# Patient Record
Sex: MALE | Race: WHITE | NOT HISPANIC OR LATINO | URBAN - METROPOLITAN AREA
[De-identification: names, ages, dates, MRNs, and addresses within clinical notes are randomized per-mention and may not be internally consistent; named-entity substitution may affect disease eponyms.]

---

## 2021-03-15 ENCOUNTER — TRANSFERRED RECORDS (OUTPATIENT)
Dept: HEALTH INFORMATION MANAGEMENT | Facility: CLINIC | Age: 61
End: 2021-03-15

## 2021-03-17 ENCOUNTER — MEDICAL CORRESPONDENCE (OUTPATIENT)
Dept: HEALTH INFORMATION MANAGEMENT | Facility: CLINIC | Age: 61
End: 2021-03-17

## 2021-03-23 ENCOUNTER — TRANSCRIBE ORDERS (OUTPATIENT)
Dept: OTHER | Age: 61
End: 2021-03-23

## 2021-03-23 DIAGNOSIS — H02.403 UNSPECIFIED PTOSIS OF BILATERAL EYELIDS: Primary | ICD-10-CM

## 2021-03-25 ENCOUNTER — TELEPHONE (OUTPATIENT)
Dept: OPHTHALMOLOGY | Facility: CLINIC | Age: 61
End: 2021-03-25

## 2021-03-25 NOTE — TELEPHONE ENCOUNTER
[facility] Call Center    Phone Message    May a detailed message be left on voicemail: no     Reason for Call: Other: Appointment questions    Very friendly patient. He is scheduled for Ptosis of bilateral eyelids, ref by Yoselin Loredo at [facility] on 3/29/2021 at 9:30 AM with Dr. Porter. Informed this would be for a consultation and you would set up a procedure from there and I am unsure if anything is ever done the same day as the consultation. He does still have questions he would like answered prior to this appointment. Routing to clinic to explain appointment.  Will he need a ?  How long is this appointment?  Is procedure same day?  Recovery time of procedure?  Will his eyes be covered after with gauze?     Action Taken: Message routed to:  [facility] ([facility]): [facility] OPHTHALMOLOGY [facility]ULT [facility] [[id number]]    Travel Screening: Negative

## 2021-03-26 ENCOUNTER — TELEPHONE (OUTPATIENT)
Dept: OPHTHALMOLOGY | Facility: CLINIC | Age: 61
End: 2021-03-26

## 2021-03-26 NOTE — TELEPHONE ENCOUNTER
FUTURE VISIT INFORMATION      FUTURE VISIT INFORMATION:    Date: 3.29.21    Time: 9:30    [place] [facility] [place]  REFERRAL INFORMATION:    Referring provider:  Yoselin Loredo    Referring providers clinic:  [facility]    Reason for visit/diagnosis: Ptosis of bilateral eyelids    RECORDS REQUESTED FROM:       Clinic name Comments Records Status Imaging Status   [facility] 3.15.21 Asif Armas Scanned                                    Action 3.26.21 7:11 AM BRANDEE   Action Taken Requested records [phone]

## 2021-03-29 ENCOUNTER — OFFICE VISIT (OUTPATIENT)
Dept: OPHTHALMOLOGY | Facility: CLINIC | Age: 61
End: 2021-03-29
Attending: NURSE PRACTITIONER
Payer: COMMERCIAL

## 2021-03-29 ENCOUNTER — PRE VISIT (OUTPATIENT)
Dept: OPHTHALMOLOGY | Facility: CLINIC | Age: 61
End: 2021-03-29

## 2021-03-29 ENCOUNTER — TELEPHONE (OUTPATIENT)
Dept: OPHTHALMOLOGY | Facility: CLINIC | Age: 61
End: 2021-03-29

## 2021-03-29 DIAGNOSIS — H02.403 UNSPECIFIED PTOSIS OF BILATERAL EYELIDS: ICD-10-CM

## 2021-03-29 DIAGNOSIS — H57.813 BROW PTOSIS, BILATERAL: Primary | ICD-10-CM

## 2021-03-29 PROBLEM — K92.1 HEMATOCHEZIA: Status: ACTIVE | Noted: 2021-03-08

## 2021-03-29 PROBLEM — K21.9 GASTROESOPHAGEAL REFLUX DISEASE: Status: ACTIVE | Noted: 2021-03-29

## 2021-03-29 PROBLEM — G89.29 CHRONIC LOW BACK PAIN: Status: ACTIVE | Noted: 2021-03-29

## 2021-03-29 PROBLEM — M19.90 OSTEOARTHRITIS: Status: ACTIVE | Noted: 2021-03-29

## 2021-03-29 PROBLEM — S74.00XA INJURY OF SCIATIC NERVE: Status: ACTIVE | Noted: 2021-03-29

## 2021-03-29 PROBLEM — G25.0 ESSENTIAL TREMOR: Status: ACTIVE | Noted: 2021-03-08

## 2021-03-29 PROBLEM — R73.03 PREDIABETES: Status: ACTIVE | Noted: 2021-03-29

## 2021-03-29 PROBLEM — E03.9 HYPOTHYROIDISM: Status: ACTIVE | Noted: 2021-03-08

## 2021-03-29 PROBLEM — F41.1 GENERALIZED ANXIETY DISORDER: Status: ACTIVE | Noted: 2021-03-29

## 2021-03-29 PROBLEM — M54.50 CHRONIC LOW BACK PAIN: Status: ACTIVE | Noted: 2021-03-29

## 2021-03-29 PROBLEM — I10 ESSENTIAL HYPERTENSION: Status: ACTIVE | Noted: 2021-03-08

## 2021-03-29 PROBLEM — E78.5 HYPERLIPIDEMIA: Status: ACTIVE | Noted: 2021-03-08

## 2021-03-29 PROBLEM — M25.519 SHOULDER PAIN: Status: ACTIVE | Noted: 2021-03-29

## 2021-03-29 PROBLEM — R41.3 POOR SHORT-TERM MEMORY: Status: ACTIVE | Noted: 2021-03-29

## 2021-03-29 PROBLEM — S06.9XAA TRAUMATIC BRAIN INJURY (H): Status: ACTIVE | Noted: 2021-03-29

## 2021-03-29 PROBLEM — K57.92 DIVERTICULITIS: Status: ACTIVE | Noted: 2021-03-08

## 2021-03-29 PROBLEM — F41.9 ANXIETY: Status: ACTIVE | Noted: 2021-03-29

## 2021-03-29 PROCEDURE — 92082 INTERMEDIATE VISUAL FIELD XM: CPT | Mod: GC | Performed by: OPHTHALMOLOGY

## 2021-03-29 PROCEDURE — 99204 OFFICE O/P NEW MOD 45 MIN: CPT | Mod: GC | Performed by: OPHTHALMOLOGY

## 2021-03-29 PROCEDURE — 92285 EXTERNAL OCULAR PHOTOGRAPHY: CPT | Mod: GC | Performed by: OPHTHALMOLOGY

## 2021-03-29 RX ORDER — PROPRANOLOL HYDROCHLORIDE 20 MG/1
TABLET ORAL
COMMUNITY
Start: 2019-12-19

## 2021-03-29 RX ORDER — ATORVASTATIN CALCIUM 40 MG/1
40 TABLET, FILM COATED ORAL
COMMUNITY
Start: 2021-03-10

## 2021-03-29 RX ORDER — LISINOPRIL 40 MG/1
TABLET ORAL
COMMUNITY
Start: 2021-01-26

## 2021-03-29 RX ORDER — DONEPEZIL HYDROCHLORIDE 10 MG/1
TABLET, FILM COATED ORAL
COMMUNITY
Start: 2019-12-19

## 2021-03-29 RX ORDER — LEVOTHYROXINE SODIUM 125 UG/1
TABLET ORAL
COMMUNITY
Start: 2021-01-26

## 2021-03-29 RX ORDER — CALCIUM POLYCARBOPHIL 625 MG
625 TABLET ORAL
COMMUNITY

## 2021-03-29 RX ORDER — CARBAMAZEPINE 200 MG/1
TABLET ORAL
COMMUNITY
Start: 2021-01-26

## 2021-03-29 RX ORDER — CYCLOBENZAPRINE HCL 10 MG
TABLET ORAL
COMMUNITY
Start: 2019-09-13

## 2021-03-29 ASSESSMENT — VISUAL ACUITY
OD_CC: 20/20
CORRECTION_TYPE: GLASSES
METHOD: SNELLEN - LINEAR
OS_CC: 20/20

## 2021-03-29 ASSESSMENT — CONF VISUAL FIELD
METHOD: COUNTING FINGERS
OS_NORMAL: 1
OD_NORMAL: 1

## 2021-03-29 ASSESSMENT — EXTERNAL EXAM - RIGHT EYE: OD_EXAM: BROW PTOSIS

## 2021-03-29 ASSESSMENT — MARGIN REFLEX DISTANCE
OS_MRD1: 1
OD_MRD1: 1

## 2021-03-29 ASSESSMENT — TONOMETRY
OS_IOP_MMHG: 14
OD_IOP_MMHG: 18
IOP_METHOD: ICARE

## 2021-03-29 ASSESSMENT — EXTERNAL EXAM - LEFT EYE: OS_EXAM: BROW PTOSIS

## 2021-03-29 NOTE — PATIENT INSTRUCTIONS
"Ptosis (Drooping Eyelids)    Eyelid ptosis (pronounced \"asa-sis\") is a condition in which the upper eyelid droops or sags. It can affect one or both eyes. Sometimes the eyelid droops enough to obstruct the upper field of vision and/or side vision, requiring correction.??Ptosis Repair is a surgical procedure that can correct drooping eyelid(s). Depending upon the degree and cause, repair involves either resection (shortening) of a muscle in the eyelid or suspension with a muscle of the brow. Typically, the levator muscle (the major muscle responsible for elevating the upper eyelid) is shortened though an incision made along the natural crease of the lid. Excess skin weighing down the eyelid may also be removed.     Congenital Ptosis  Present from birth, the most common cause of congenital ptosis is the improper development of the levator muscle. Children may need tilt their head back or lift their eyelid with a finger to see. They may also develop amblyopia (\"lazy eye\"), strabismus (eyes that are not properly aligned), astigmatism, or blurred vision. Repair for mild to moderate congenital ptosis is generally performed between ages 3 and 5. Severe visual obstruction may require earlier treatment. ??Repair is usually performed in an outpatient surgical facility under general anesthesia so the child will not become anxious or restless during the procedure.     Acquired Ptosis  Most commonly due to age-related weakening of the levator muscle, acquired ptosis may also be caused by injury, trauma, or procedures, such as cataract surgery, which can cause weak tendons to stretch. Acquired ptosis may also be the first sign of some diseases, such as myasthenia gravis (a disorder in which the muscles become weak), or Marly's syndrome (a neurological condition that indicates injury to part of the sympathetic nervous system).? Ptosis Repair is usually performed in an outpatient surgical facility under anesthesia that induces a " "\"twilight\" state. Sedated consciousness is preferred so that Dr. Porter can accurately adjust the eyelids.     Who Should Perform The Surgery?   When choosing a surgeon to perform ptosis surgery, look for a cosmetic and reconstructive surgeon who specializes in the eyelids, orbit, and tear drain system. Dr. Porter's membership in the American Society of Ophthalmic Plastic and Reconstructive Surgery (ASOPRS) indicates he or she is not only a board certified ophthalmologist who knows the anatomy and structure of the eyelids and orbit, but also has had extensive training in ophthalmic plastic reconstructive and cosmetic surgery.    "

## 2021-03-29 NOTE — NURSING NOTE
Chief Complaints and History of Present Illnesses   Patient presents with     Droopy Both Upper Lids     Chief Complaint(s) and History of Present Illness(es)     Droopy Both Upper Lids     Laterality: right upper lid and left upper lid    Duration: 1 year    Timing: when reading    Associated signs and symptoms: Negative for eye pain, dry eyes and tearing    Response to treatment: no improvement              Comments     Arjun Amaya is being seen for a consult today by the request of Dr. Loredo for Droopy bilateral upper eyelids.  Pt notes that he difficulty seeing due to lids being droopy, x 1 years, he notes lashes irritate his eyes due to lids being so low, he notes that they are getting worse, he has difficulty driving and reading, he notes when lifting lids he is able to see much more and better. Has cataracts starting as well he is worried when doing cataract sx after this procedure that it could stretch his lids again.     Ruma Swartz COT March 29, 2021 9:24 AM

## 2021-03-29 NOTE — PROGRESS NOTES
Oculoplastic Clinic New Patient    Patient: Arjun Amaya MRN# [id number]   YOB: 1960 Age: 61 year old   Date of Visit: Mar 29, 2021    CC: Droopy eyelids obstructing vision.              HPI:     Chief Complaint(s) and History of Present Illness(es)     Droopy Both Upper Lids     Laterality: right upper lid and left upper lid    Duration: 1 year    Timing: when reading    Associated signs and symptoms: Negative for eye pain, dry eyes and   tearing    Response to treatment: no improvement              Comments     Arjun Amaya is being seen for a consult today by the request of Dr. Loredo for Droopy bilateral upper eyelids.  Pt notes that he difficulty   seeing due to lids being droopy, x 1 years, he notes lashes irritate his   eyes due to lids being so low, he notes that they are getting worse, he   has difficulty driving and reading, he notes when lifting lids he is able   to see much more and better. Has cataracts starting as well he is worried   when doing cataract sx after this procedure that it could stretch his lids   again.     Ruma Swartz COT March 29, 2021 9:24 AM                  Arjun Amaya is a 61 year old male who has noted gradual onset of droopy eyelids over the past years. The droopy eyelid is interfering with activities of daily living including driving, and reading. The patient reports double vision when takes off his glasses. ariability of the eyelid position, or dry eye symptoms.     EXAM:     MRD1: 1/1  Brow ptosis with brow resting below superior orbital rim bilateral    VISUAL FIELD:  Right eye untaped:0 degrees Right eye taped:40 degrees  Left eye untaped:0 degrees Left eye taped:40 degrees    Assessment & Plan     Arjun Amaya is a 61 year old male with the following diagnoses:   1. Brow ptosis, bilateral    2. Unspecified ptosis of bilateral eyelids       PLAN:  Bilateral upper lids ptosis repair - Javed's muscle conjunctival resection  9/9      ANTICOAGULATION:  Takes aspirin for headache and ibuprofen for back. Able to hold.          PHOTOS DEMONSTRATE:  Blepharoptosis  Brow ptosis with thicker brow skin and hairs below the lateral superior orbital rim    Cecille Edouard MD  Ophthalmology Resident, PGY-2  [facility]   Attending Physician Attestation:  Complete documentation of historical and exam elements from today's encounter can be found in the full encounter summary report (not reduplicated in this progress note).  I personally obtained the chief complaint(s) and history of present illness.  I confirmed and edited as necessary the review of systems, past medical/surgical history, family history, social history, and examination findings as documented by others; and I examined the patient myself.  I personally reviewed the relevant tests, images, and reports as documented above.  I formulated and edited as necessary the assessment and plan and discussed the findings and management plan with the patient and family.  I personally reviewed the ophthalmic test(s) associated with this encounter, agree with the interpretation(s) as documented by the resident/fellow, and have edited the corresponding report(s) as necessary.   -Jayce Porter MD        Today with Arjun Amaya I reviewed the indications, risks, benefits, and alternatives of the proposed surgical procedure including, but not limited to, failure obtain the desired result  and need for additional surgery, bleeding, infection, loss of vision, loss of the eye, and the remote possibility of permanent damage to any organ system or death with the use of anesthesia.  I provided multiple opportunities for the questions, answered all questions to the best of my ability, and confirmed that my answers and my discussion were understood. -Jayce Porter MD

## 2021-03-29 NOTE — TELEPHONE ENCOUNTER
Spoke with patient to schedule surgery with Dr. Porter    Surgery was scheduled on 4/21 at [place] [facility]  Patient will have H[facility]P at [facility]     Patient is aware a COVID-19 test is needed before their procedure. The test should be with-in 4 days of their procedure.   [facility] Details: [facility]    Post-Op visit was scheduled on 5/3  Patient is aware a / is needed day of surgery.   Surgery packet was mailed 3/29, patient has my direct contact information for any further questions.

## 2021-03-29 NOTE — LETTER
3/29/2021         RE:  :  MRN: Arjun Amaya  1960  [id number]     Dear Dr. Yoselin Loredo,    Thank you for asking me to see your patient, Arjun Amaya, for an oculoplastic   consultation.  My assessment and plan are below.  For further details, please see my attached clinic note.      Arjun Amaya is a 61 year old male who has noted gradual onset of droopy eyelids over the past years. The droopy eyelid is interfering with activities of daily living including driving, and reading. The patient reports double vision when takes off his glasses. ariability of the eyelid position, or dry eye symptoms.     Assessment & Plan     Arjun Amaya is a 61 year old male with the following diagnoses:   1. Brow ptosis, bilateral    2. Unspecified ptosis of bilateral eyelids       PLAN:  Bilateral ptosis repair    Again, thank you for allowing me to participate in the care of your patient.      Sincerely,    Jayce Porter MD  Department of Ophthalmology and Visual Neurosciences  [facility]    CC: Yoselin Loredo NP  [address] [place] [address]  Via Fax: [phone]

## 2021-04-07 DIAGNOSIS — Z11.59 ENCOUNTER FOR SCREENING FOR OTHER VIRAL DISEASES: ICD-10-CM

## 2021-04-14 LAB — HEP C HIM: NORMAL

## 2021-04-19 ENCOUNTER — TRANSFERRED RECORDS (OUTPATIENT)
Dept: HEALTH INFORMATION MANAGEMENT | Facility: CLINIC | Age: 61
End: 2021-04-19

## 2021-04-20 ENCOUNTER — ANESTHESIA EVENT (OUTPATIENT)
Dept: SURGERY | Facility: AMBULATORY SURGERY CENTER | Age: 61
End: 2021-04-20

## 2021-04-20 ENCOUNTER — TELEPHONE (OUTPATIENT)
Dept: OPHTHALMOLOGY | Facility: CLINIC | Age: 61
End: 2021-04-20

## 2021-04-20 DIAGNOSIS — Z11.59 ENCOUNTER FOR SCREENING FOR OTHER VIRAL DISEASES: ICD-10-CM

## 2021-04-20 LAB
LABORATORY COMMENT REPORT: NORMAL
SARS-COV-2 RNA RESP QL NAA+PROBE: NEGATIVE
SARS-COV-2 RNA RESP QL NAA+PROBE: NORMAL
SPECIMEN SOURCE: NORMAL
SPECIMEN SOURCE: NORMAL

## 2021-04-20 PROCEDURE — U0005 INFEC AGEN DETEC AMPLI PROBE: HCPCS | Mod: 90 | Performed by: PATHOLOGY

## 2021-04-20 PROCEDURE — U0003 INFECTIOUS AGENT DETECTION BY NUCLEIC ACID (DNA OR RNA); SEVERE ACUTE RESPIRATORY SYNDROME CORONAVIRUS 2 (SARS-COV-2) (CORONAVIRUS DISEASE [COVID-19]), AMPLIFIED PROBE TECHNIQUE, MAKING USE OF HIGH THROUGHPUT TECHNOLOGIES AS DESCRIBED BY CMS-2020-01-R: HCPCS | Mod: 90 | Performed by: PATHOLOGY

## 2021-04-20 NOTE — TELEPHONE ENCOUNTER
Called patient to confirm that he has done his H&P.    Patient reports [facility] [place] [facility]   Patient reports that they done an EKG and withdrew some blood.    Patient also confirmed that he needs a covid test.  Patient has been scheduled for a covid test on 4/20 at the [facility] at 10:30 am.

## 2021-04-20 NOTE — ANESTHESIA PREPROCEDURE EVALUATION
Anesthesia Pre-Procedure Evaluation    Patient: Arjun Amaya   MRN: [id number] : 1960        Preoperative Diagnosis: Unspecified ptosis of bilateral eyelids [H02.403]   Procedure : Procedure(s):  Bilateral upper lid ptosis repair     No past medical history on file.   No past surgical history on file.   Allergies   Allergen Reactions     Ciprofloxacin Rash     Rash       Adderall      Other reaction(s): Psychotic disorder     Dextroamphetamine      Other reaction(s): Hallucination     Contrast Dye GI Disturbance     Other reaction(s): Unknown Reaction     Valproic Acid Nausea and Vomiting      Social History     Tobacco Use     Smoking status: Never Smoker     Smokeless tobacco: Never Used   Substance Use Topics     Alcohol use: Not on file      Wt Readings from Last 1 Encounters:   No data found for Wt        Anesthesia Evaluation   Pt has had prior anesthetic. Type: General.    No history of anesthetic complications       ROS/MED HX  ENT/Pulmonary:    (-) asthma   Neurologic:       Cardiovascular:     (+) hypertension-range: 110/90/ ----    METS/Exercise Tolerance: 4 - Raking leaves, gardening Comment: Goes up 1 flight of stairs at home, otherwise not very active.    Hematologic:       Musculoskeletal:       GI/Hepatic:     (+) GERD (With certain foods. No symptoms recently. ), Asymptomatic on medication,     Renal/Genitourinary:       Endo:     (+) thyroid problem,  hyperthyroidism, Obesity,     Psychiatric/Substance Use:       Infectious Disease:       Malignancy:       Other:            Physical Exam    Airway        Mallampati: I   TM distance: > 3 FB   Neck ROM: full   Mouth opening: > 3 cm    Respiratory Devices and Support         Dental     Comment: Poor dentition    (+) missing      Cardiovascular          Rhythm and rate: regular and normal     Pulmonary           breath sounds clear to auscultation           OUTSIDE LABS:  CBC: No results found for: WBC, HGB, HCT, PLT  BMP: No results found  for: NA, POTASSIUM, CHLORIDE, CO2, BUN, CR, GLC  COAGS: No results found for: PTT, INR, FIBR  POC: No results found for: BGM, HCG, HCGS  HEPATIC: No results found for: ALBUMIN, PROTTOTAL, ALT, AST, GGT, ALKPHOS, BILITOTAL, BILIDIRECT, CLEMENTINA  OTHER: No results found for: PH, LACT, A1C, RAMONA, PHOS, MAG, LIPASE, AMYLASE, TSH, T4, T3, CRP, SED    Anesthesia Plan    ASA Status:  3   NPO Status:  NPO Appropriate    Anesthesia Type: MAC.     - Reason for MAC: straight local not clinically adequate              Consents    Anesthesia Plan(s) and associated risks, benefits, and realistic alternatives discussed. Questions answered and patient/representative(s) expressed understanding.     - Discussed with:  Patient         Postoperative Care    Pain management: IV analgesics, Oral pain medications, Multi-modal analgesia.        Comments:    Discussed plan for MAC, including risk of aspiration pneumonia, backup plan of general anesthesia and risks of general anesthesia, including sore throat/hoarse voice, abrasions/damage to lips/tongue/teeth, nausea, rare complications (including medication reactions, cardiac, pulmonary).  Discuss possibility of intraoperative awareness.            Yuni Pennington MD

## 2021-04-20 NOTE — TELEPHONE ENCOUNTER
Called patient to confirm that [facility] had [facility] [place].  Patient reports that he has not gotten his covid test.    Patient has been schedule for a covid test at [facility] on 4/20 at 10:30 am for his covid test.

## 2021-04-21 ENCOUNTER — HOSPITAL ENCOUNTER (OUTPATIENT)
Facility: AMBULATORY SURGERY CENTER | Age: 61
Discharge: HOME | End: 2021-04-21
Attending: OPHTHALMOLOGY | Admitting: OPHTHALMOLOGY
Payer: COMMERCIAL

## 2021-04-21 ENCOUNTER — ANESTHESIA (OUTPATIENT)
Dept: SURGERY | Facility: AMBULATORY SURGERY CENTER | Age: 61
End: 2021-04-21

## 2021-04-21 VITALS
SYSTOLIC BLOOD PRESSURE: 109 MMHG | RESPIRATION RATE: 16 BRPM | HEIGHT: 73 IN | BODY MASS INDEX: 37.11 KG/M2 | TEMPERATURE: 97.4 F | DIASTOLIC BLOOD PRESSURE: 67 MMHG | HEART RATE: 90 BPM | WEIGHT: 280 LBS | OXYGEN SATURATION: 97 %

## 2021-04-21 DIAGNOSIS — H02.403 UNSPECIFIED PTOSIS OF BILATERAL EYELIDS: ICD-10-CM

## 2021-04-21 PROCEDURE — 67908 REPAIR EYELID DEFECT: CPT | Mod: LT

## 2021-04-21 RX ORDER — SODIUM CHLORIDE, SODIUM LACTATE, POTASSIUM CHLORIDE, CALCIUM CHLORIDE 600; 310; 30; 20 MG/100ML; MG/100ML; MG/100ML; MG/100ML
INJECTION, SOLUTION INTRAVENOUS CONTINUOUS PRN
Status: DISCONTINUED | OUTPATIENT
Start: 2021-04-21 | End: 2021-04-21

## 2021-04-21 RX ORDER — ONDANSETRON 2 MG/ML
INJECTION INTRAMUSCULAR; INTRAVENOUS PRN
Status: DISCONTINUED | OUTPATIENT
Start: 2021-04-21 | End: 2021-04-21

## 2021-04-21 RX ORDER — NALOXONE HYDROCHLORIDE 0.4 MG/ML
0.4 INJECTION, SOLUTION INTRAMUSCULAR; INTRAVENOUS; SUBCUTANEOUS
Status: DISCONTINUED | OUTPATIENT
Start: 2021-04-21 | End: 2021-04-22 | Stop reason: HOSPADM

## 2021-04-21 RX ORDER — ACETAMINOPHEN 325 MG/1
975 TABLET ORAL ONCE
Status: COMPLETED | OUTPATIENT
Start: 2021-04-21 | End: 2021-04-21

## 2021-04-21 RX ORDER — OXYCODONE HYDROCHLORIDE 5 MG/1
5 TABLET ORAL EVERY 4 HOURS PRN
Status: DISCONTINUED | OUTPATIENT
Start: 2021-04-21 | End: 2021-04-22 | Stop reason: HOSPADM

## 2021-04-21 RX ORDER — NALOXONE HYDROCHLORIDE 0.4 MG/ML
0.2 INJECTION, SOLUTION INTRAMUSCULAR; INTRAVENOUS; SUBCUTANEOUS
Status: DISCONTINUED | OUTPATIENT
Start: 2021-04-21 | End: 2021-04-22 | Stop reason: HOSPADM

## 2021-04-21 RX ORDER — ONDANSETRON 2 MG/ML
4 INJECTION INTRAMUSCULAR; INTRAVENOUS EVERY 30 MIN PRN
Status: DISCONTINUED | OUTPATIENT
Start: 2021-04-21 | End: 2021-04-22 | Stop reason: HOSPADM

## 2021-04-21 RX ORDER — PROPOFOL 10 MG/ML
INJECTION, EMULSION INTRAVENOUS PRN
Status: DISCONTINUED | OUTPATIENT
Start: 2021-04-21 | End: 2021-04-21

## 2021-04-21 RX ORDER — FENTANYL CITRATE 50 UG/ML
INJECTION, SOLUTION INTRAMUSCULAR; INTRAVENOUS PRN
Status: DISCONTINUED | OUTPATIENT
Start: 2021-04-21 | End: 2021-04-21

## 2021-04-21 RX ORDER — LIDOCAINE HYDROCHLORIDE 20 MG/ML
INJECTION, SOLUTION INFILTRATION; PERINEURAL PRN
Status: DISCONTINUED | OUTPATIENT
Start: 2021-04-21 | End: 2021-04-21

## 2021-04-21 RX ORDER — TETRACAINE HYDROCHLORIDE 5 MG/ML
SOLUTION OPHTHALMIC PRN
Status: DISCONTINUED | OUTPATIENT
Start: 2021-04-21 | End: 2021-04-21 | Stop reason: HOSPADM

## 2021-04-21 RX ORDER — FENTANYL CITRATE 50 UG/ML
25-50 INJECTION, SOLUTION INTRAMUSCULAR; INTRAVENOUS
Status: DISCONTINUED | OUTPATIENT
Start: 2021-04-21 | End: 2021-04-22 | Stop reason: HOSPADM

## 2021-04-21 RX ORDER — ONDANSETRON 4 MG/1
4 TABLET, ORALLY DISINTEGRATING ORAL EVERY 30 MIN PRN
Status: DISCONTINUED | OUTPATIENT
Start: 2021-04-21 | End: 2021-04-22 | Stop reason: HOSPADM

## 2021-04-21 RX ORDER — OXYCODONE HYDROCHLORIDE 5 MG/1
5 TABLET ORAL EVERY 6 HOURS PRN
Qty: 8 TABLET | Refills: 0 | Status: SHIPPED | OUTPATIENT
Start: 2021-04-21 | End: 2022-01-03

## 2021-04-21 RX ORDER — ERYTHROMYCIN 5 MG/G
OINTMENT OPHTHALMIC PRN
Status: DISCONTINUED | OUTPATIENT
Start: 2021-04-21 | End: 2021-04-21 | Stop reason: HOSPADM

## 2021-04-21 RX ORDER — ERYTHROMYCIN 5 MG/G
OINTMENT OPHTHALMIC
Qty: 3.5 G | Refills: 0 | Status: SHIPPED | OUTPATIENT
Start: 2021-04-21 | End: 2022-01-03

## 2021-04-21 RX ORDER — GABAPENTIN 300 MG/1
300 CAPSULE ORAL ONCE
Status: COMPLETED | OUTPATIENT
Start: 2021-04-21 | End: 2021-04-21

## 2021-04-21 RX ORDER — LIDOCAINE 40 MG/G
CREAM TOPICAL
Status: DISCONTINUED | OUTPATIENT
Start: 2021-04-21 | End: 2021-04-21 | Stop reason: HOSPADM

## 2021-04-21 RX ORDER — SODIUM CHLORIDE, SODIUM LACTATE, POTASSIUM CHLORIDE, CALCIUM CHLORIDE 600; 310; 30; 20 MG/100ML; MG/100ML; MG/100ML; MG/100ML
INJECTION, SOLUTION INTRAVENOUS CONTINUOUS
Status: DISCONTINUED | OUTPATIENT
Start: 2021-04-21 | End: 2021-04-22 | Stop reason: HOSPADM

## 2021-04-21 RX ORDER — SODIUM CHLORIDE, SODIUM LACTATE, POTASSIUM CHLORIDE, CALCIUM CHLORIDE 600; 310; 30; 20 MG/100ML; MG/100ML; MG/100ML; MG/100ML
500 INJECTION, SOLUTION INTRAVENOUS CONTINUOUS
Status: DISCONTINUED | OUTPATIENT
Start: 2021-04-21 | End: 2021-04-21 | Stop reason: HOSPADM

## 2021-04-21 RX ORDER — ALBUTEROL SULFATE 0.83 MG/ML
2.5 SOLUTION RESPIRATORY (INHALATION) EVERY 4 HOURS PRN
Status: DISCONTINUED | OUTPATIENT
Start: 2021-04-21 | End: 2021-04-22 | Stop reason: HOSPADM

## 2021-04-21 RX ORDER — LIDOCAINE HYDROCHLORIDE AND EPINEPHRINE 10; 10 MG/ML; UG/ML
INJECTION, SOLUTION INFILTRATION; PERINEURAL PRN
Status: DISCONTINUED | OUTPATIENT
Start: 2021-04-21 | End: 2021-04-21 | Stop reason: HOSPADM

## 2021-04-21 RX ORDER — NEOMYCIN POLYMYXIN B SULFATES AND DEXAMETHASONE 3.5; 10000; 1 MG/ML; [USP'U]/ML; MG/ML
SUSPENSION/ DROPS OPHTHALMIC
Qty: 5 ML | Refills: 0 | Status: SHIPPED | OUTPATIENT
Start: 2021-04-21 | End: 2021-12-14

## 2021-04-21 RX ORDER — MEPERIDINE HYDROCHLORIDE 25 MG/ML
12.5 INJECTION INTRAMUSCULAR; INTRAVENOUS; SUBCUTANEOUS
Status: DISCONTINUED | OUTPATIENT
Start: 2021-04-21 | End: 2021-04-22 | Stop reason: HOSPADM

## 2021-04-21 RX ORDER — KETOROLAC TROMETHAMINE 30 MG/ML
30 INJECTION, SOLUTION INTRAMUSCULAR; INTRAVENOUS EVERY 6 HOURS PRN
Status: DISCONTINUED | OUTPATIENT
Start: 2021-04-21 | End: 2021-04-22 | Stop reason: HOSPADM

## 2021-04-21 RX ADMIN — ACETAMINOPHEN 975 MG: 325 TABLET ORAL at 08:42

## 2021-04-21 RX ADMIN — LIDOCAINE HYDROCHLORIDE 60 MG: 20 INJECTION, SOLUTION INFILTRATION; PERINEURAL at 09:35

## 2021-04-21 RX ADMIN — ONDANSETRON 4 MG: 2 INJECTION INTRAMUSCULAR; INTRAVENOUS at 09:51

## 2021-04-21 RX ADMIN — PROPOFOL 80 MG: 10 INJECTION, EMULSION INTRAVENOUS at 09:35

## 2021-04-21 RX ADMIN — GABAPENTIN 300 MG: 300 CAPSULE ORAL at 08:42

## 2021-04-21 RX ADMIN — SODIUM CHLORIDE, SODIUM LACTATE, POTASSIUM CHLORIDE, CALCIUM CHLORIDE: 600; 310; 30; 20 INJECTION, SOLUTION INTRAVENOUS at 09:29

## 2021-04-21 RX ADMIN — FENTANYL CITRATE 25 MCG: 50 INJECTION, SOLUTION INTRAMUSCULAR; INTRAVENOUS at 09:46

## 2021-04-21 RX ADMIN — SODIUM CHLORIDE, SODIUM LACTATE, POTASSIUM CHLORIDE, CALCIUM CHLORIDE 500 ML: 600; 310; 30; 20 INJECTION, SOLUTION INTRAVENOUS at 09:05

## 2021-04-21 ASSESSMENT — MIFFLIN-ST. JEOR: SCORE: 2138.95

## 2021-04-21 NOTE — DISCHARGE INSTRUCTIONS
Post-operative Instructions    Ophthalmic Plastic and Reconstructive Surgery  Jayce Porter M.D.  Rishabh Pruitt M.D., M.P.H.    All instructions apply to the operated eye(s) or eyelid(s)    What to expect after surgery:    There will be some swelling, bruising, and likely a black eye (even into the lower eyelids and cheeks). Also expect crusting and discharge from the eye and/or incisions.     A small amount of surface bleeding is normal for the first 48 hours after surgery.    You may notice some bloody tears for the first few days after surgery. This is normal.    Your eye(s) and eyelid(s) may be painful and tender. This is normal after surgery. Use the pain medication as prescribed. If your pain does not improve despite the medication, contact the office.    Wound care and personal care:    If a patch or bandage has been placed, please leave this in place until seen in clinic. Prevent the bandage from getting wet.     Apply ice compresses 15 minutes on 15 minutes off while awake for the first 2 days after surgery, then switch to warm compresses 4 times a day until seen by your physician.     For warm packs you can place a cup of dry uncooked rice in a clean cotton sock. Place sock in microwave 30 seconds to one minute. Next place the warm sock into a plastic bag and wrap the bag with clean warm wet washcloth and place over operated eye.      You may shower or wash your hair the day after surgery. Do not bathe or go swimming for 1 week to prevent contamination of your wounds.    Do not apply make-up to the eyes or eyelids for 2 weeks after surgery.    Activity restrictions and driving:    Avoid heavy lifting, bending, exercise or strenuous activity for 1 week after surgery.    You may resume other activities and return to work as tolerated.    You may not resume driving until have you stopped using narcotic pain medications(such as Norco, Percocet, Tylenol #3).    Sinus Precautions for 1 week: Sneeze  with mouth open. Cough with mouth open. No blowing nose. No straws. No bending over.    Medications:    Restart all your regular home medications and eye drops today. If you take Plavix or Aspirin on a regular basis, wait for 3 days after your surgery before restarting these in order to decrease the risk of bleeding complications.    Avoid aspirin and aspirin-like medications (Motrin, Aleve, Ibuprofen, Juanita[place] etc) for 5 days to reduce the risk of bleeding. You may take Tylenol (acetaminophen) for pain.    In addition to your home medications, take the following post-operative medications as prescribed by your physician:    Apply antibiotic ointment (erythromycin) to inner lower lid of operative eye(s) at bedtime, and as needed during the day for eye irritation.    Instill eye drops (Maxitrol) four times a day until the bottle finished.     Take 1 to 2 pain pills (norco or oxycodone as prescribed) as needed for pain up to every 4 hours.    The pain pills may make you drowsy. You must not drive a car, operate heavy machinery or drink alcohol while taking them.    The pain pills may cause constipation and nausea. Take them with some food to prevent a stomach upset. If you continue to experience nausea, call your physician.      WARNING: All the prescription pain medications listed above contain Tylenol (acetaminophen). You must not take more than 4,000 mg of acetaminophen per 24-hour period. This is equivalent to 6 tablets of Darvocet, 8 tablets of Vicodin, or 12 tablets of Norco, Percocet or Tylenol #3. If you take other over-the-counter medications containing acetaminophen, you must take the amount of acetaminophen into account and reduce the number of prescribed pain pills accordingly.    Contact information and follow-up:    Return to the Eye Clinic for a follow-up appointment with your physician as  scheduled. If no appointment has been scheduled, call [phone] for an  appointment with Dr. Porter  within 1 to 2 weeks from your date of surgery.  -     Please email a few photos of your eye(s) or other operative site(s) to [email or web address] prior to your follow up visit.      For severe pain, bleeding, or loss of vision, call the Eye Clinic at [phone].    After hours or on weekends and holidays, call [phone] and ask to speak with the ophthalmologist on call.  [facility]  Home Care Following Anesthesia  For 24 hours after surgery:  1. Get plenty of rest.  A responsible adult must stay with you for at least 24 hours after you leave the surgery center.  2. Do not drive or use heavy equipment.  If you have weakness or tingling, don't drive or use heavy equipment until this feeling goes away.   3. Do not drink alcohol.   4. Avoid strenuous or risky activities.  Ask for help when climbing stairs.  5. You may feel lightheaded.  IF so, sit for a few minutes before standing.  Have someone help you get up.   6. If you have nausea (feel sick to your stomach): Drink only clear liquids such as apple juice, ginger ale, broth or 7-Up.  Rest may also help.  Be sure to drink enough fluids.  Move to a regular diet as you feel able.   7. You may have a slight fever.  Call the doctor if your fever is over 100 F (37.7 C) (taken under the tongue) or lasts longer than 24 hours.  8. You may have a dry mouth, a sore throat, muscle aches or trouble sleeping. These should go away after 24 hours.  9. Do not make important or legal decisions.   10. It is recommended to avoid smoking.               Tips for taking pain medications  To get the best pain relief possible, remember these points:    Take pain medications as directed, before pain becomes severe.    Pain medication can upset your stomach: taking it with food may help.    Constipation is a common side effect of pain medication. Drink plenty of  fluids.    Eat foods high in fiber. Take a stool softener if recommended by your  doctor or pharmacist.    Do not drink alcohol, drive or operate machinery while taking pain medications.    Ask about other ways to control pain, such as with heat, ice or relaxation.    Tylenol/Acetaminophen Consumption  To help encourage the safe use of acetaminophen, the makers of TYLENOL  have lowered the maximum daily dose for single-ingredient Extra Strength TYLENOL  (acetaminophen) products sold in the [place] from 8 pills per day (4,000 mg) to 6 pills per day (3,000 mg). The dosing interval has also changed from 2 pills every 4-6 hours to 2 pills every 6 hours.    If you feel your pain relief is insufficient, you may take Tylenol/Acetaminophen in addition to your narcotic pain medication.     Be careful not to exceed 3,000 mg of Tylenol/Acetaminophen in a 24 hour period from all sources.    If you are taking extra strength Tylenol/acetaminophen (500 mg), the maximum dose is 6 tablets in 24 hours.    If you are taking regular strength acetaminophen (325 mg), the maximum dose is 9 tablets in 24 hours.    Call a doctor for any of the followin. Signs of infection (fever, growing tenderness at the surgery site, a large amount of drainage or bleeding, severe pain, foul-smelling drainage, redness, swelling).  2. It has been over 8 to 10 hours since surgery and you are still not able to urinate (pass water).  3. Headache for over 24 hours.  4. Numbness, tingling or weakness the day after surgery (if you had spinal anesthesia).  5. Signs of Covid-19 infection (temperature over 100 degrees, shortness of breath, cough, loss of taste/smell, generalized body aches, persistent headache, chills, sore throat, nausea/vomiting/diarrhea)  Your doctor is:  Dr. Jayce Porter, Ophthalmology: [phone]                    Or dial [phone] and ask for the resident on call for:  Ophthalmology  For emergency care, call the:  [facility]:  [phone] (TTY for hearing impaired:  [phone])

## 2021-04-21 NOTE — ANESTHESIA CARE TRANSFER NOTE
Patient: Arjun Amaya    Procedure(s):  Bilateral upper lid ptosis repair    Diagnosis: Unspecified ptosis of bilateral eyelids [H02.403]  Diagnosis Additional Information: No value filed.    Anesthesia Type:   MAC     Note:      Level of Consciousness: awake  Oxygen Supplementation: room air    Independent Airway: airway patency satisfactory and stable        Patient transferred to: Phase II    Handoff Report: Identifed the Patient, Identified the Reponsible Provider, Reviewed the pertinent medical history, Discussed the surgical course, Reviewed Intra-OP anesthesia mangement and issues during anesthesia, Set expectations for post-procedure period and Allowed opportunity for questions and acknowledgement of understanding      Vitals: (Last set prior to Anesthesia Care Transfer)  CRNA VITALS  4/21/2021 0923 - 4/21/2021 1000      4/21/2021             Resp Rate (set):  10    EKG:  NSR        Electronically Signed By: ALEJANDRO Marques CRNA  April 21, 2021  10:00 AM

## 2021-04-21 NOTE — OP NOTE
PREOPERATIVE DIAGNOSIS: Bilateral upper eyelid ptosis.   POSTOPERATIVE DIAGNOSIS:  Bilateral upper eyelid ptosis.   PROCEDURE:Bilateral upper eyelid ptosis repair by Javed's muscle conjunctival resection.   SURGEON: Jayce Porter MD  ASSISTANT: Rishabh Pruitt MD and  Abby Carrera MD  ANESTHESIA: Monitored with local infiltration of a 50/50 mixture of 2% lidocaine with epinephrine and 0.5% Marcaine.   COMPLICATIONS: None.   ESTIMATED BLOOD LOSS: Less than 5 cc.   HISTORY: Arjun Amaya presented with upper lid ptosis interfering with the superior visual field and activities of daily living. After the risks, benefits and alternatives to the proposed procedure were explained, informed consent was obtained.   PROCEDURE: The patient was brought to the operating room and placed supine on the operating table. IV sedation was given. Both upper eyelids were infiltrated with local anesthetic and  was prepped and draped in the typical sterile ophthalmic fashion. Atttention was directed to the right  side.  A 4-0 silk suture was placed through the eyelid margin and the eyelid everted over a Desmarres retractor. A 6-0 silk suture was then threaded through the conjunctiva and Javed's muscle  4.5mm from the superior tarsal border. These sutures were used to elevate the conjunctiva and Ajved's muscle which was gently peeled from the underlying levator muscle. The Putterman clamp was used and clamped over the elevated tissues. A 6-0 plain gut suture was run in a horizontal mattress fashion 1 mm below the clamp from lateral to medial, then medial to lateral. The elevated tissues were excised with a 15 blade. The sutures were then externalized and tied in the lid crease. The 4-0 silk suture was removed. The lid was reverted to its normal position and ophthalmic antibiotic ointment placed in the eye. Attention was directed to the left side where the same procedure was performed. The patient tolerated the procedure  well and left the operating room in stable condition.   LENORA PATEL MD

## 2021-04-21 NOTE — BRIEF OP NOTE
[facility] Brief Operative Note    Pre-operative diagnosis: Unspecified ptosis of bilateral eyelids [H02.403]   Post-operative diagnosis Same   Procedure: Procedure(s):  Bilateral upper lid ptosis repair   Surgeon(s): Surgeon(s) and Role:     * Jayce Porter MD - Primary     * Rishabh Pruitt MD - Fellow - Assisting   Estimated blood loss: 1mL    Specimens: * No specimens in log *   Findings: As expected

## 2021-04-22 ENCOUNTER — TELEPHONE (OUTPATIENT)
Dept: OPHTHALMOLOGY | Facility: CLINIC | Age: 61
End: 2021-04-22

## 2021-04-22 NOTE — TELEPHONE ENCOUNTER
Telephone call to Arjun Amaya    Doing well with no pain, good vision, and no bleeding. All questions were answered, he is doing well, and postoperative care was reviewed.  A postop appointment has been scheduled.    Rishabh Pruitt MD

## 2021-04-28 ENCOUNTER — TELEPHONE (OUTPATIENT)
Dept: OPHTHALMOLOGY | Facility: CLINIC | Age: 61
End: 2021-04-28

## 2021-04-28 NOTE — TELEPHONE ENCOUNTER
[phone] home/cell    No answer and unable to leave voicemail at 0855    Note to care coordinator to for review of message attached    Adebayo Villalta RN 8:55 AM 04/29/21    --      No answer and unable to reach at 1019    Adebayo Villalta RN 10:19 AM 04/28/21        Arjun Amaya [phone]      Poor connection at 0850    Called back times three and unable to reach and mail box full    Adebayo Villalta RN 8:55 AM 04/28/21            [facility] Call Center    Phone Message    May a detailed message be left on voicemail: yes     Reason for Call: Symptoms or Concerns     If patient has red-flag symptoms, warm transfer to triage line    Current symptom or concern: The pt states that after the 4.21 surgery, his eyes are open and he can see, however the eyebrow has drooped over the eyes and he has no peripheral vision now. And the R eye feels scratchy like there is something in it.     Symptoms have been present for:  3 day(s)    Has patient previously been seen for this? Yes    By : German    Date: 4.21.21    Are there any new or worsening symptoms? Yes: see above.    ALSO - The pt is also asking for [facility] give [facility] confirming what day he was here and that he had surgery that day. He needs to provide this to the [facility] for travel reimbursement. Please mail to the pt. Thanks.        Action Taken: Message routed to:  [facility]    Travel Screening: Not Applicable

## 2021-05-04 ENCOUNTER — TELEPHONE (OUTPATIENT)
Dept: OPHTHALMOLOGY | Facility: CLINIC | Age: 61
End: 2021-05-04

## 2021-05-04 NOTE — TELEPHONE ENCOUNTER
[facility] Call Center    Phone Message    May a detailed message be left on voicemail: yes     Reason for Call: Form or Letter   Type or form/letter needing completion: Pt needs a doctor's note for appt on 4/21.  Provider: Dr. Porter  Date form needed: ASAP  Once completed: Mail form to Name: Arjun Amaya, at Address: [address] [place] [address]      Action Taken: Message routed to:  [facility] EYE    Travel Screening: Not Applicable

## 2021-05-04 NOTE — LETTER
May 5, 2021      Re: Arjun Amaya   1960    To Whom It May Concern:    This is to confirm that the above patient had eye surgery on 4/21/2021.      Thank you for your cooperation in this matter.  Please do not hesitate to contact me if you have any further questions.    Sincerely,      LENORA PATEL

## 2021-05-05 ENCOUNTER — TELEPHONE (OUTPATIENT)
Dept: OPHTHALMOLOGY | Facility: CLINIC | Age: 61
End: 2021-05-05

## 2021-05-05 NOTE — TELEPHONE ENCOUNTER
Unable to reach or LVM for patient regarding rescheduling missed Post-op appointment with . [facility] was full. Sent no show letter to patient so patient aware of need to reschedule.

## 2021-05-05 NOTE — TELEPHONE ENCOUNTER
Patient returned missed call and was able to reschedule missed POST-OP appointment for next available.  Sent appointment letter to confirmed email and home address-Per Pateint

## 2021-05-05 NOTE — TELEPHONE ENCOUNTER
Spoke with patient's son regarding missed POST-OpP for patient.  Unable to reach or LVM for patient regarding rescheduling missed Post-op appointment with  as [facility] was full. Patient's son will let patient know when he stops by patient's home later this week. -Per Patient's son

## 2021-05-21 ENCOUNTER — OFFICE VISIT (OUTPATIENT)
Dept: OPHTHALMOLOGY | Facility: CLINIC | Age: 61
End: 2021-05-21
Payer: COMMERCIAL

## 2021-05-21 DIAGNOSIS — Z98.890 POSTOPERATIVE EYE STATE: Primary | ICD-10-CM

## 2021-05-21 DIAGNOSIS — H02.423 MYOGENIC PTOSIS OF EYELID OF BOTH EYES: ICD-10-CM

## 2021-05-21 PROCEDURE — 99024 POSTOP FOLLOW-UP VISIT: CPT | Performed by: OPHTHALMOLOGY

## 2021-05-21 ASSESSMENT — SLIT LAMP EXAM - LIDS
COMMENTS: NORMAL
COMMENTS: NORMAL

## 2021-05-21 ASSESSMENT — VISUAL ACUITY
METHOD: SNELLEN - LINEAR
OS_CC+: -1
OD_CC: 20/20
OS_CC: 20/20
CORRECTION_TYPE: GLASSES

## 2021-05-21 ASSESSMENT — MARGIN REFLEX DISTANCE
OS_MRD1: 4
OD_MRD1: 4

## 2021-05-21 ASSESSMENT — EXTERNAL EXAM - LEFT EYE: OS_EXAM: NORMAL

## 2021-05-21 ASSESSMENT — TONOMETRY
OD_IOP_MMHG: 16
IOP_METHOD: ICARE
OS_IOP_MMHG: 17

## 2021-05-21 ASSESSMENT — EXTERNAL EXAM - RIGHT EYE: OD_EXAM: NORMAL

## 2021-05-21 NOTE — LETTER
May 21, 2021      Re: Arjun Amaya   1960    To Whom It May Concern:    This is to confirm that the above patient was seen on 5/21/2021.      Thank you for your cooperation in this matter.  Please do not hesitate to contact me if you have any further questions.    Sincerely,      LENORA PATEL

## 2021-05-21 NOTE — NURSING NOTE
"Chief Complaints and History of Present Illnesses   Patient presents with     Post Op (Ophthalmology) Both Eyes     POM#1 s/p BUL ptosis repair by [facility]     Chief Complaint(s) and History of Present Illness(es)     Post Op (Ophthalmology) Both Eyes     Laterality: both eyes    Associated symptoms: Negative for eye pain, dryness and tearing    Pain scale: 0/10    Comments: POM#1 s/p BUL ptosis repair by [facility]              Comments     Pt notes that \"I can see\" he feels that the lid is pulled away from the eye in the RE in the outside corner of the eye.     Ruma Swartz COT May 21, 2021 9:46 AM                  "

## 2021-05-21 NOTE — PROGRESS NOTES
"Chief Complaints and History of Present Illnesses   Patient presents with     Post Op (Ophthalmology) Both Eyes     POM#1 s/p BUL ptosis repair by [facility]     Chief Complaint(s) and History of Present Illness(es)     Post Op (Ophthalmology) Both Eyes     In both eyes.  Associated symptoms include Negative for eye pain, dryness   and tearing.  Pain was noted as 0/10. Additional comments: POM#1 s/p BUL   ptosis repair by [facility]              Comments     Pt notes that \"I can see\" he feels that the lid is pulled away from the   eye in the RE in the outside corner of the eye.     Ruma Swartz COT May 21, 2021 9:46 AM                     Assessment & Plan     Arjun Amaya is a 61 year old male with the following diagnoses:   1. Postoperative eye state    2. Myogenic ptosis of eyelid of both eyes       -healed  Return to clinic as needed                Attending Physician Attestation:  Complete documentation of historical and exam elements from today's encounter can be found in the full encounter summary report (not reduplicated in this progress note).  I personally obtained the chief complaint(s) and history of present illness.  I confirmed and edited as necessary the review of systems, past medical/surgical history, family history, social history, and examination findings as documented by others; and I examined the patient myself.  I personally reviewed the relevant tests, images, and reports as documented above.  I formulated and edited as necessary the assessment and plan and discussed the findings and management plan with the patient and family.  -Jayce Porter MD  10:04 AM 5/21/2021  "

## 2021-10-04 NOTE — PROGRESS NOTES
HPI:  Arjun Amaya is a 61 year old male presenting for cataract evaluation.    Saw Dr. Gayle (optom) in 10/2019 as glaucoma suspect but reportedly with full HVF 24-2, IOP 20 each eye, gonio open, pachy 549 each eye, full OCT RNFL, left eye cup>right.  Saw Dr. Porter for bilateral brow ptosis repair 4/2021 with last visit 5/2021 with good postop result and healed.    [facility].  Noticing glare and haloes, blurred vision, doubling. No eye pain. Stable old floaters. No flashes.  He states that he has had glare and haloes since childhood but the blurring/doubling (overlapping, not diplopia) has been going on for the past month or so. Vision is worsening especially at night and glare has gotten worse.  He feels like there is something in the center of his vision that is blocking his vision and he has to look around it. This is also new in the past month. He feels like it was present prior but only bothersome the past month.  [facility] and [facility] for cataract surgery.  He has been doing internet research about seeing a yellow or red honeycomb shape in his vision. He has asked multiple eye doctors about this as well and no one has ever found an issue. The honeycomb looks red when he has a headache.  The honeycomb shape does not move and covers his whole vision. Sometimes lasts all day and then resolves on its own.  Has been happening on and off for the past 5 years. He feels like he sees a little flicker in his vision since his TBI and processes images more slowly.    Currently having a lot of issues with side effects from his medications. Feels low energy and short of breath. Addressing with PCP.    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    FH:  No known family history of blindness, glaucoma, macular degeneration    ASSESSMENT and PLAN:  1. Combined form of age-related cataract, right eye  - visually significant cataract with glare, haloes, and decline in vision especially at night over the past months.  - We discussed the risks, benefits and alternatives of cataract surgery, including risk of bleeding, infection, postoperative changes in intraocular pressure, postoperative inflammation, possibility of retinal detachment or vision loss.  Discussed need for follow up appointments after surgery and the need for postoperative drops.  Informed consent was obtained.  The patient decided to proceed with CE/IOL OD.    We discussed lens options and refractive target. We discussed that by aiming for distance, will need glasses for near.  Additionally discussed multifocal and he is not interested given longstanding issues with glare including in childhood. We also discussed toric lenses and he is potentially interested in these lenses.  Target: -0.5    Dilates to: 8 mm  Alpha blockers/Flomax: None  Trauma/Pseudoxfoliation: None  Fuchs dystrophy/guttae: None    Diabetes: No  Anticoagulation: None    Cyl: 3D -- would be interested in toric if covered by [facility]    Proceed with CE/IOL OD.    Surgical plan:  Topical  Trypan blue  Post-op acular    - biometries with AL 24.30 right eye and 24.88 left eye with 3D cyl right eye and 2D left eye, consistent with refraction.  - noted to have inconsistent responses with BAT    2. Combined form of age-related cataract, left eye  - address after #1    3. 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 History of ptosis repair  - s/p brow ptosis repair with Dr. Porter 4/2021  - well healed and happy with results  - monitor      Follow up for CE/IOL right eye with POD1 visit or sooner  PRN        -----------------------------------------------------------------------------------    Attestation:  Complete documentation of historical and exam elements from today's encounter can be found in the full encounter summary report (not reduplicated in this progress note). I personally obtained the chief complaint(s) and history of present illness.  I confirmed and edited as necessary the review of systems, past medical/surgical history, family history, social history, and examination findings as documented by others; and I examined the patient myself. I personally reviewed the relevant tests, images, and reports as documented above.     I formulated and edited as necessary the assessment and plan and discussed the findings and management plan with the patient and family.      Thania Condon MD

## 2021-10-05 ENCOUNTER — OFFICE VISIT (OUTPATIENT)
Dept: OPHTHALMOLOGY | Facility: CLINIC | Age: 61
End: 2021-10-05
Attending: OPHTHALMOLOGY
Payer: COMMERCIAL

## 2021-10-05 DIAGNOSIS — H25.812 COMBINED FORM OF AGE-RELATED CATARACT, LEFT EYE: ICD-10-CM

## 2021-10-05 DIAGNOSIS — Z98.890 HISTORY OF PTOSIS REPAIR: ICD-10-CM

## 2021-10-05 DIAGNOSIS — H47.392 CUP TO DISC ASYMMETRY, LEFT: ICD-10-CM

## 2021-10-05 DIAGNOSIS — H25.811 COMBINED FORM OF AGE-RELATED CATARACT, RIGHT EYE: Primary | ICD-10-CM

## 2021-10-05 PROCEDURE — 99214 OFFICE O/P EST MOD 30 MIN: CPT | Performed by: OPHTHALMOLOGY

## 2021-10-05 PROCEDURE — G0463 HOSPITAL OUTPT CLINIC VISIT: HCPCS

## 2021-10-05 ASSESSMENT — VISUAL ACUITY
OD_CC+: -2
OS_BAT_MED: 20/50
OD_BAT_HIGH: 20/80
OS_BAT_LOW: 20/30
OD_CC: 20/20
CORRECTION_TYPE: GLASSES
METHOD: SNELLEN - LINEAR
OD_BAT_MED: 20/40
OS_CC: 20/20
OS_BAT_HIGH: 20/150
OD_BAT_LOW: 20/20

## 2021-10-05 ASSESSMENT — REFRACTION_MANIFEST
OD_ADD: +2.50
OD_AXIS: 025
OD_SPHERE: -1.25
OS_ADD: +2.50
OS_AXIS: 173
OS_CYLINDER: +2.25
OS_SPHERE: -2.50
OD_CYLINDER: +3.75

## 2021-10-05 ASSESSMENT — SLIT LAMP EXAM - LIDS
COMMENTS: VERY DEEP SULCUS
COMMENTS: VERY DEEP SULCUS

## 2021-10-05 ASSESSMENT — TONOMETRY
OS_IOP_MMHG: 18
OD_IOP_MMHG: 18
IOP_METHOD: TONOPEN

## 2021-10-05 ASSESSMENT — CONF VISUAL FIELD
OD_NORMAL: 1
METHOD: COUNTING FINGERS
OS_NORMAL: 1

## 2021-10-05 ASSESSMENT — REFRACTION_WEARINGRX
OD_SPHERE: -1.00
OD_ADD: +2.50
OD_CYLINDER: +3.50
OS_SPHERE: -2.50
OD_AXIS: 024
OS_CYLINDER: +2.50
OS_ADD: +2.50
OS_AXIS: 165

## 2021-10-05 ASSESSMENT — CUP TO DISC RATIO
OD_RATIO: 0.4
OS_RATIO: 0.55

## 2021-10-05 NOTE — NURSING NOTE
Chief Complaints and History of Present Illnesses   Patient presents with     Cataract     Chief Complaint(s) and History of Present Illness(es)     Cataract     Laterality: both eyes    Associated symptoms: haloes, double vision and blurred vision    Course: stable              Comments     Pt here for cataract eval - referral from [facility] [place], operated by [facility]  Complains of vision BE being blurry.  Complains of having trouble with glare, halos, and diplopia.  States it is hard for him to focus and feels he constantly is trying to refocus his eyes.  Hx of floaters- unchanged.  Denies any flashes or eye pain.  Ocular meds: None    Ineslucia Hatch OT 8:49 AM October 5, 2021

## 2021-10-05 NOTE — Clinical Note
Chema Gudino,  Can we please schedule Arjun for right eye cataract surgery at the [facility]? [facility] and would be interested in a toric lens if the [facility] will cover the cost, but not if he has to pay out of pocket. Do you know if this is covered, or who I can talk to about it?    Thanks!  Thania

## 2021-10-06 ENCOUNTER — TELEPHONE (OUTPATIENT)
Dept: OPHTHALMOLOGY | Facility: CLINIC | Age: 61
End: 2021-10-06

## 2021-10-06 PROBLEM — H25.811 COMBINED FORM OF AGE-RELATED CATARACT, RIGHT EYE: Status: ACTIVE | Noted: 2021-10-06

## 2021-10-06 NOTE — TELEPHONE ENCOUNTER
Spoke with patient to schedule surgery with Dr. Condon    Surgery was scheduled on 10/21 at [facility]  Patient will have H&P at [facility]     Patient is aware a COVID-19 test is needed before their procedure. The test should be with-in 4 days of their procedure.   Test Details: Date 10/18 Location [facility]    Post-Op visit was scheduled on 10/22 AND 10/29  Patient is aware a / is needed day of surgery.   Surgery packet was mailed 10/6, patient has my direct contact information for any further questions.

## 2021-10-07 DIAGNOSIS — Z11.59 ENCOUNTER FOR SCREENING FOR OTHER VIRAL DISEASES: ICD-10-CM

## 2021-10-12 DIAGNOSIS — H25.811 COMBINED FORM OF AGE-RELATED CATARACT, RIGHT EYE: Primary | ICD-10-CM

## 2021-10-12 RX ORDER — KETOROLAC TROMETHAMINE 5 MG/ML
1 SOLUTION OPHTHALMIC 4 TIMES DAILY
Qty: 10 ML | Refills: 0 | Status: SHIPPED | OUTPATIENT
Start: 2021-10-12 | End: 2021-10-29

## 2021-10-12 RX ORDER — OFLOXACIN 3 MG/ML
1 SOLUTION/ DROPS OPHTHALMIC
Status: CANCELLED | OUTPATIENT
Start: 2021-10-12

## 2021-10-12 RX ORDER — OFLOXACIN 3 MG/ML
1-2 SOLUTION/ DROPS OPHTHALMIC 4 TIMES DAILY
Qty: 5 ML | Refills: 0 | Status: SHIPPED | OUTPATIENT
Start: 2021-10-12 | End: 2021-10-15 | Stop reason: ALTCHOICE

## 2021-10-12 RX ORDER — PREDNISOLONE ACETATE 10 MG/ML
1-2 SUSPENSION/ DROPS OPHTHALMIC 4 TIMES DAILY
Qty: 15 ML | Refills: 1 | Status: SHIPPED | OUTPATIENT
Start: 2021-10-12 | End: 2021-10-29

## 2021-10-14 ENCOUNTER — TELEPHONE (OUTPATIENT)
Dept: OPHTHALMOLOGY | Facility: CLINIC | Age: 61
End: 2021-10-14

## 2021-10-14 NOTE — TELEPHONE ENCOUNTER
[facility] pharmacy calling to review Rx for ofloxacin eye drops for upcoming cataract surgery 10-.    H/o of previous ciprofloxacin allergy/rash    Note to Dr. Condon to review/update alternate eye drop for upcoming cataract surgery in post-op period    Adebayo Villalta RN 7:41 AM 10/14/21

## 2021-10-15 DIAGNOSIS — H25.811 COMBINED FORM OF AGE-RELATED CATARACT, RIGHT EYE: Primary | ICD-10-CM

## 2021-10-15 RX ORDER — POLYMYXIN B SULFATE AND TRIMETHOPRIM 1; 10000 MG/ML; [USP'U]/ML
1-2 SOLUTION OPHTHALMIC 4 TIMES DAILY
Qty: 10 ML | Refills: 0 | Status: SHIPPED | OUTPATIENT
Start: 2021-10-15 | End: 2021-12-14

## 2021-10-18 ENCOUNTER — TELEPHONE (OUTPATIENT)
Dept: OPHTHALMOLOGY | Facility: CLINIC | Age: 61
End: 2021-10-18

## 2021-10-18 NOTE — TELEPHONE ENCOUNTER
[facility] Call Center    Phone Message    May a detailed message be left on voicemail: yes     Reason for Call: Other:    Lalitha from [facility] is seeing pt for his pre op. Lalitha is calling to request for a updated form with procedure name and anesthesia information to be FAXED TO #: [phone]      Action Taken: Other:  EYE     Travel Screening: Not Applicable

## 2021-10-18 NOTE — TELEPHONE ENCOUNTER
Received a message that patient would like the H&P forms and name of procedure faxed to  [phone].    Patients H&P, Covid, order, and procedure name has been sent to to [phone] at the patients request.

## 2021-10-19 ENCOUNTER — TELEPHONE (OUTPATIENT)
Dept: OPHTHALMOLOGY | Facility: CLINIC | Age: 61
End: 2021-10-19

## 2021-10-19 NOTE — TELEPHONE ENCOUNTER
Pt confirmed will go ahead with Cataract surgery and schedule dental work another date    Reviewed ok to cancel dental procedure if not urgent and proceed with cataract surgery    Note to Dr. Condon for review    Adebayo Villalta RN 5:28 PM 10/19/21    --        Spoke to Dr. Condon at [phone]    Dr. Condon prefers to wait at least one week between dental work and cataract surgery    If tooth removal urgent, ok to post-pone cataract surgery.    If both non-urgent pt may either reschedule dental work to later date-- at least a week post cataract surgery-- or reschedule cataract surgery     Called 4 times and no answer and unable to leave voicemail      Spoke to son/emergency contact who will be seeing patient around 5 or 6 and will review options and call direct triage number after review    Adebayo Villalta RN 3:44 PM 10/19/21              [facility] Call Center    Phone Message    May a detailed message be left on voicemail: yes     Reason for Call: Other:    Pt has a surgery on 10/21. Pt is getting 4 of his teeth pulled tomorrow and would like to know if this will interfere with the surgery. Please follow-up with pt asap before end of day today if possible.     Action Taken: Other:  eye    Travel Screening: Not Applicable

## 2021-10-20 ENCOUNTER — ANESTHESIA EVENT (OUTPATIENT)
Dept: SURGERY | Facility: AMBULATORY SURGERY CENTER | Age: 61
End: 2021-10-20
Payer: COMMERCIAL

## 2021-10-21 ENCOUNTER — HOSPITAL ENCOUNTER (OUTPATIENT)
Facility: AMBULATORY SURGERY CENTER | Age: 61
End: 2021-10-21
Attending: OPHTHALMOLOGY
Payer: COMMERCIAL

## 2021-10-21 ENCOUNTER — ANESTHESIA (OUTPATIENT)
Dept: SURGERY | Facility: AMBULATORY SURGERY CENTER | Age: 61
End: 2021-10-21
Payer: COMMERCIAL

## 2021-10-21 VITALS
WEIGHT: 280 LBS | HEART RATE: 79 BPM | TEMPERATURE: 97.6 F | RESPIRATION RATE: 14 BRPM | BODY MASS INDEX: 37.11 KG/M2 | SYSTOLIC BLOOD PRESSURE: 114 MMHG | HEIGHT: 73 IN | OXYGEN SATURATION: 95 % | DIASTOLIC BLOOD PRESSURE: 79 MMHG

## 2021-10-21 DIAGNOSIS — H25.811 COMBINED FORM OF AGE-RELATED CATARACT, RIGHT EYE: ICD-10-CM

## 2021-10-21 PROCEDURE — 66984 XCAPSL CTRC RMVL W/O ECP: CPT | Mod: RT

## 2021-10-21 PROCEDURE — 66984 XCAPSL CTRC RMVL W/O ECP: CPT | Mod: RT | Performed by: OPHTHALMOLOGY

## 2021-10-21 DEVICE — EYE IMP IOL ALCON PCL SN60WF ACRYSOF IQ 21.0: Type: IMPLANTABLE DEVICE | Site: EYE | Status: FUNCTIONAL

## 2021-10-21 RX ORDER — POLYMYXIN B SULFATE AND TRIMETHOPRIM 10000; 1 1/ML; MG/ML
SOLUTION OPHTHALMIC PRN
Status: DISCONTINUED | OUTPATIENT
Start: 2021-10-21 | End: 2021-10-21 | Stop reason: HOSPADM

## 2021-10-21 RX ORDER — PREDNISOLONE ACETATE 1 %
SUSPENSION, DROPS(FINAL DOSAGE FORM)(ML) OPHTHALMIC (EYE) PRN
Status: DISCONTINUED | OUTPATIENT
Start: 2021-10-21 | End: 2021-10-21 | Stop reason: HOSPADM

## 2021-10-21 RX ORDER — ONDANSETRON 2 MG/ML
4 INJECTION INTRAMUSCULAR; INTRAVENOUS EVERY 30 MIN PRN
Status: DISCONTINUED | OUTPATIENT
Start: 2021-10-21 | End: 2021-10-22 | Stop reason: HOSPADM

## 2021-10-21 RX ORDER — LIDOCAINE HYDROCHLORIDE 10 MG/ML
INJECTION, SOLUTION EPIDURAL; INFILTRATION; INTRACAUDAL; PERINEURAL PRN
Status: DISCONTINUED | OUTPATIENT
Start: 2021-10-21 | End: 2021-10-21 | Stop reason: HOSPADM

## 2021-10-21 RX ORDER — TOBRAMYCIN 3 MG/ML
1 SOLUTION/ DROPS OPHTHALMIC
Status: COMPLETED | OUTPATIENT
Start: 2021-10-21 | End: 2021-10-21

## 2021-10-21 RX ORDER — OXYCODONE HYDROCHLORIDE 5 MG/1
5 TABLET ORAL EVERY 4 HOURS PRN
Status: DISCONTINUED | OUTPATIENT
Start: 2021-10-21 | End: 2021-10-22 | Stop reason: HOSPADM

## 2021-10-21 RX ORDER — MEPERIDINE HYDROCHLORIDE 25 MG/ML
12.5 INJECTION INTRAMUSCULAR; INTRAVENOUS; SUBCUTANEOUS
Status: DISCONTINUED | OUTPATIENT
Start: 2021-10-21 | End: 2021-10-22 | Stop reason: HOSPADM

## 2021-10-21 RX ORDER — FENTANYL CITRATE 50 UG/ML
25 INJECTION, SOLUTION INTRAMUSCULAR; INTRAVENOUS EVERY 5 MIN PRN
Status: DISCONTINUED | OUTPATIENT
Start: 2021-10-21 | End: 2021-10-21 | Stop reason: HOSPADM

## 2021-10-21 RX ORDER — LIDOCAINE 40 MG/G
CREAM TOPICAL
Status: DISCONTINUED | OUTPATIENT
Start: 2021-10-21 | End: 2021-10-21 | Stop reason: HOSPADM

## 2021-10-21 RX ORDER — CYCLOPENTOLAT/TROPIC/PHENYLEPH 1%-1%-2.5%
1 DROPS (EA) OPHTHALMIC (EYE)
Status: COMPLETED | OUTPATIENT
Start: 2021-10-21 | End: 2021-10-21

## 2021-10-21 RX ORDER — ACETAMINOPHEN 325 MG/1
975 TABLET ORAL ONCE
Status: COMPLETED | OUTPATIENT
Start: 2021-10-21 | End: 2021-10-21

## 2021-10-21 RX ORDER — PROPARACAINE HYDROCHLORIDE 5 MG/ML
1 SOLUTION/ DROPS OPHTHALMIC ONCE
Status: COMPLETED | OUTPATIENT
Start: 2021-10-21 | End: 2021-10-21

## 2021-10-21 RX ORDER — BALANCED SALT SOLUTION 6.4; .75; .48; .3; 3.9; 1.7 MG/ML; MG/ML; MG/ML; MG/ML; MG/ML; MG/ML
SOLUTION OPHTHALMIC PRN
Status: DISCONTINUED | OUTPATIENT
Start: 2021-10-21 | End: 2021-10-21 | Stop reason: HOSPADM

## 2021-10-21 RX ORDER — FENTANYL CITRATE 50 UG/ML
INJECTION, SOLUTION INTRAMUSCULAR; INTRAVENOUS PRN
Status: DISCONTINUED | OUTPATIENT
Start: 2021-10-21 | End: 2021-10-21

## 2021-10-21 RX ORDER — SODIUM CHLORIDE, SODIUM LACTATE, POTASSIUM CHLORIDE, CALCIUM CHLORIDE 600; 310; 30; 20 MG/100ML; MG/100ML; MG/100ML; MG/100ML
INJECTION, SOLUTION INTRAVENOUS CONTINUOUS
Status: DISCONTINUED | OUTPATIENT
Start: 2021-10-21 | End: 2021-10-22 | Stop reason: HOSPADM

## 2021-10-21 RX ORDER — FENTANYL CITRATE 50 UG/ML
25 INJECTION, SOLUTION INTRAMUSCULAR; INTRAVENOUS
Status: DISCONTINUED | OUTPATIENT
Start: 2021-10-21 | End: 2021-10-22 | Stop reason: HOSPADM

## 2021-10-21 RX ORDER — ONDANSETRON 4 MG/1
4 TABLET, ORALLY DISINTEGRATING ORAL EVERY 30 MIN PRN
Status: DISCONTINUED | OUTPATIENT
Start: 2021-10-21 | End: 2021-10-22 | Stop reason: HOSPADM

## 2021-10-21 RX ORDER — SODIUM CHLORIDE, SODIUM LACTATE, POTASSIUM CHLORIDE, CALCIUM CHLORIDE 600; 310; 30; 20 MG/100ML; MG/100ML; MG/100ML; MG/100ML
INJECTION, SOLUTION INTRAVENOUS CONTINUOUS
Status: DISCONTINUED | OUTPATIENT
Start: 2021-10-21 | End: 2021-10-21 | Stop reason: HOSPADM

## 2021-10-21 RX ADMIN — Medication 1 DROP: at 08:25

## 2021-10-21 RX ADMIN — Medication 1 DROP: at 08:32

## 2021-10-21 RX ADMIN — SODIUM CHLORIDE, SODIUM LACTATE, POTASSIUM CHLORIDE, CALCIUM CHLORIDE: 600; 310; 30; 20 INJECTION, SOLUTION INTRAVENOUS at 08:30

## 2021-10-21 RX ADMIN — PROPARACAINE HYDROCHLORIDE 1 DROP: 5 SOLUTION/ DROPS OPHTHALMIC at 08:24

## 2021-10-21 RX ADMIN — TOBRAMYCIN 1 DROP: 3 SOLUTION/ DROPS OPHTHALMIC at 08:40

## 2021-10-21 RX ADMIN — FENTANYL CITRATE 50 MCG: 50 INJECTION, SOLUTION INTRAMUSCULAR; INTRAVENOUS at 09:41

## 2021-10-21 RX ADMIN — Medication 1 DROP: at 08:40

## 2021-10-21 RX ADMIN — TOBRAMYCIN 1 DROP: 3 SOLUTION/ DROPS OPHTHALMIC at 08:31

## 2021-10-21 RX ADMIN — TOBRAMYCIN 1 DROP: 3 SOLUTION/ DROPS OPHTHALMIC at 08:24

## 2021-10-21 RX ADMIN — ACETAMINOPHEN 975 MG: 325 TABLET ORAL at 08:46

## 2021-10-21 ASSESSMENT — MIFFLIN-ST. JEOR: SCORE: 2138.95

## 2021-10-21 NOTE — BRIEF OP NOTE
[facility] Brief Operative Note    Pre-operative diagnosis: Combined form of age-related cataract, right eye [H25.811]   Post-operative diagnosis Combined form of age-related cataract, right eye   Procedure: Procedure(s):  RIGHT EYE PHACOEMULSIFICATION, CATARACT, WITH STANDARD INTRAOCULAR LENS IMPLANT INSERTION   Surgeon(s): Surgeon(s) and Role:     * Thania Condon MD - Primary   Estimated blood loss: 0 mL    Specimens: None   Findings: See full operative report

## 2021-10-21 NOTE — ANESTHESIA CARE TRANSFER NOTE
Patient: Arjun Amaya    Procedure: Procedure(s):  RIGHT EYE PHACOEMULSIFICATION, CATARACT, WITH STANDARD INTRAOCULAR LENS IMPLANT INSERTION       Diagnosis: Combined form of age-related cataract, right eye [H25.811]  Diagnosis Additional Information: No value filed.    Anesthesia Type:   MAC     Note:    Oropharynx: oropharynx clear of all foreign objects and spontaneously breathing  Level of Consciousness: awake  Oxygen Supplementation: room air    Independent Airway: airway patency satisfactory and stable  Dentition: dentition unchanged  Vital Signs Stable: post-procedure vital signs reviewed and stable  Report to RN Given: handoff report given  Patient transferred to: Phase II    Handoff Report: Identifed the Patient, Identified the Reponsible Provider, Reviewed the pertinent medical history, Discussed the surgical course, Reviewed Intra-OP anesthesia mangement and issues during anesthesia, Set expectations for post-procedure period and Allowed opportunity for questions and acknowledgement of understanding      Vitals:  Vitals Value Taken Time   /79 10/21/21 1030   Temp 36.4  C (97.6  F) 10/21/21 1030   Pulse 79 10/21/21 1030   Resp 14 10/21/21 1030   SpO2 95 % 10/21/21 1030       Electronically Signed By: ALEJANDRO Tidwell CRNA  October 21, 2021  10:31 AM

## 2021-10-21 NOTE — ANESTHESIA PREPROCEDURE EVALUATION
Anesthesia Pre-Procedure Evaluation    Patient: Arjun Amaya   MRN: [id number] : 1960        Preoperative Diagnosis: Combined form of age-related cataract, right eye [H25.811]    Procedure : Procedure(s):  RIGHT EYE PHACOEMULSIFICATION, CATARACT, WITH STANDARD INTRAOCULAR LENS IMPLANT INSERTION          No past medical history on file.   Past Surgical History:   Procedure Laterality Date     REPAIR PTOSIS BILATERAL Bilateral 2021    Procedure: Bilateral upper lid ptosis repair;  Surgeon: Jayce Porter MD;  Location: [facility] OR      Allergies   Allergen Reactions     Ciprofloxacin Rash     Rash       Adderall      Other reaction(s): Psychotic disorder     Dextroamphetamine      Other reaction(s): Hallucination     Contrast Dye GI Disturbance     Other reaction(s): Unknown Reaction     Valproic Acid Nausea and Vomiting      Social History     Tobacco Use     Smoking status: Never Smoker     Smokeless tobacco: Never Used   Substance Use Topics     Alcohol use: Not on file      Wt Readings from Last 1 Encounters:   21 127 kg (280 lb)        Anesthesia Evaluation   Pt has had prior anesthetic. Type: General.    No history of anesthetic complications       ROS/MED HX  ENT/Pulmonary:    (-) asthma   Neurologic:       Cardiovascular:     (+) hypertension-range: 110/90/ ----    METS/Exercise Tolerance: 3 - Able to walk 1-2 blocks without stopping Comment: Goes up 1 flight of stairs at home, otherwise not very active.    Hematologic:       Musculoskeletal:       GI/Hepatic:     (+) GERD (With certain foods. No symptoms recently. ), Asymptomatic on medication,     Renal/Genitourinary:       Endo:     (+) thyroid problem,  hyperthyroidism, Obesity,     Psychiatric/Substance Use:       Infectious Disease:       Malignancy:       Other:            Physical Exam    Airway  airway exam normal           Respiratory Devices and Support         Dental  no notable dental history         Cardiovascular    cardiovascular exam normal          Pulmonary   pulmonary exam normal                OUTSIDE LABS:  CBC: No results found for: WBC, HGB, HCT, PLT  BMP: No results found for: NA, POTASSIUM, CHLORIDE, CO2, BUN, CR, GLC  COAGS: No results found for: PTT, INR, FIBR  POC: No results found for: BGM, HCG, HCGS  HEPATIC: No results found for: ALBUMIN, PROTTOTAL, ALT, AST, GGT, ALKPHOS, BILITOTAL, BILIDIRECT, CLEMENTINA  OTHER: No results found for: PH, LACT, A1C, RAMONA, PHOS, MAG, LIPASE, AMYLASE, TSH, T4, T3, CRP, SED    Anesthesia Plan    ASA Status:  2   NPO Status:  NPO Appropriate    Anesthesia Type: MAC.     - Reason for MAC: straight local not clinically adequate              Consents    Anesthesia Plan(s) and associated risks, benefits, and realistic alternatives discussed. Questions answered and patient/representative(s) expressed understanding.     - Discussed with:  Patient         Postoperative Care    Pain management: Oral pain medications.   PONV prophylaxis: Ondansetron (or other 5HT-3)     Comments:                Philipp Middleton MD

## 2021-10-21 NOTE — DISCHARGE INSTRUCTIONS
[facility]  Home Care Following Anesthesia  For 24 hours after surgery:  1. Get plenty of rest.  A responsible adult must stay with you for at least 24 hours after you leave the surgery center.  2. Do not drive or use heavy equipment.  If you have weakness or tingling, don't drive or use heavy equipment until this feeling goes away.   3. Do not drink alcohol.   4. Avoid strenuous or risky activities.  Ask for help when climbing stairs.  5. You may feel lightheaded.  IF so, sit for a few minutes before standing.  Have someone help you get up.   6. If you have nausea (feel sick to your stomach): Drink only clear liquids such as apple juice, ginger ale, broth or 7-Up.  Rest may also help.  Be sure to drink enough fluids.  Move to a regular diet as you feel able.   7. You may have a slight fever.  Call the doctor if your fever is over 100 F (37.7 C) (taken under the tongue) or lasts longer than 24 hours.  8. You may have a dry mouth, a sore throat, muscle aches or trouble sleeping. These should go away after 24 hours.  9. Do not make important or legal decisions.   10. It is recommended to avoid smoking.     Tips for taking pain medications  To get the best pain relief possible, remember these points:    Take pain medications as directed, before pain becomes severe.    Pain medication can upset your stomach: taking it with food may help.    Constipation is a common side effect of pain medication. Drink plenty of  fluids.    Eat foods high in fiber. Take a stool softener if recommended by your doctor or pharmacist.    Do not drink alcohol, drive or operate machinery while taking pain medications.    Ask about other ways to control pain, such as with heat, ice or relaxation.    Tylenol/Acetaminophen Consumption  To help encourage the safe use of acetaminophen, the makers of TYLENOL  have lowered the maximum daily dose for single-ingredient Extra Strength TYLENOL   (acetaminophen)  products sold in the [place] from 8 pills per day (4,000 mg) to 6 pills per day (3,000 mg). The dosing interval has also changed from 2 pills every 4-6 hours to 2 pills every 6 hours.    If you feel your pain relief is insufficient, you may take Tylenol/Acetaminophen in addition to your narcotic pain medication.     Be careful not to exceed 3,000 mg of Tylenol/Acetaminophen in a 24 hour period from all sources.    If you are taking extra strength Tylenol/acetaminophen (500 mg), the maximum dose is 6 tablets in 24 hours.    If you are taking regular strength acetaminophen (325 mg), the maximum dose is 9 tablets in 24 hours.    You received 975 mg of tylenol at 8:45 AM, next dose available after 2:45 PM-then follow the package instructions    Call a doctor for any of the followin. Signs of infection (fever, growing tenderness at the surgery site, a large amount of drainage or bleeding, severe pain, foul-smelling drainage, redness, swelling).  2. It has been over 8 to 10 hours since surgery and you are still not able to urinate (pass water).  3. Headache for over 24 hours.  4. Signs of Covid-19 infection (temperature over 100 degrees, shortness of breath, cough, loss of taste/smell, generalized body aches, persistent headache, chills, sore throat, nausea/vomiting/diarrhea)  Your doctor is:       Dr. Thania Condon, Ophthalmology: [phone]               Or dial [phone] and ask for the resident on call for:  Ophthalmology  For emergency care, call the:  [place]:  [phone] (TTY for hearing impaired: [phone])

## 2021-10-21 NOTE — OP NOTE
Pre-operative Diagnosis: Combined forms of age-related cataract right eye    Post-operative Diagnosis: Combined forms of age-related cataract right eye    Procedure: Phacoemulsification cataract extraction with intraocular lens insertion right eye    Surgeon: Thania Condon MD    Estimated Blood Loss: 0mL    Specimens: None    Implant: [id number] +21.0D    SN [id number]    The patient was brought into the Operating Room where an intravenous line was started and EKG monitor leads were placed.  Local anesthesia was achieved using tetracaine drops in the right eye.  The right eye was prepped and draped in the usual sterile manner for ocular surgery including the use of 5% Betadine irrigation of the conjunctival fornices.  A Kevin speculum was inserted.  A Supersharp blade was used to create a paracentesis.  Next, 0.5 mL of lidocaine was instilled in the anterior chamber followed by Viscoat.  A 2.4 mm keratome was used to fashion a triplanar main incision.  A 360 degree continuous curvilinear capsulorrhexis was fashioned with a cystotome and Utrata forceps.  Hydrodissection was performed using balanced salt solution on a 30 gauge cannula. Phacoemulsification was performed.  Residual cortical material was removed using the irrigation and aspiration handpiece.  The capsular bag was filled with Provisc.  The lens was inspected and found to be without flaw. The [id number] +21.0D implant was  delivered into the capsular bag, and was centered well.  Viscoelastic was removed with the irrigation/aspiration handpiece. The anterior chamber was reformed with balanced salt solution  The wound and paracentesis were hydrated.  All incisions were tested and found to be watertight.  Ofloxacin, ketorolac, and prednisolone drops were placed on the eye.  The speculum and drapes were removed.  The eye was cleaned and a shield was put in place.  The patient tolerated the procedure well and left the Operating Room in good condition.

## 2021-10-22 ENCOUNTER — OFFICE VISIT (OUTPATIENT)
Dept: OPHTHALMOLOGY | Facility: CLINIC | Age: 61
End: 2021-10-22
Attending: OPHTHALMOLOGY
Payer: COMMERCIAL

## 2021-10-22 ENCOUNTER — TELEPHONE (OUTPATIENT)
Dept: OPHTHALMOLOGY | Facility: CLINIC | Age: 61
End: 2021-10-22

## 2021-10-22 DIAGNOSIS — Z96.1 PSEUDOPHAKIA, RIGHT EYE: Primary | ICD-10-CM

## 2021-10-22 DIAGNOSIS — H25.812 COMBINED FORM OF AGE-RELATED CATARACT, LEFT EYE: ICD-10-CM

## 2021-10-22 PROCEDURE — 99024 POSTOP FOLLOW-UP VISIT: CPT | Performed by: OPHTHALMOLOGY

## 2021-10-22 PROCEDURE — G0463 HOSPITAL OUTPT CLINIC VISIT: HCPCS

## 2021-10-22 ASSESSMENT — VISUAL ACUITY
METHOD: SNELLEN - LINEAR
OS_CC: 20/20
OD_SC: 20/60
OD_PH_SC: 20/30
OD_PH_SC+: -2
OD_SC+: -2

## 2021-10-22 ASSESSMENT — TONOMETRY
OS_IOP_MMHG: 15
OD_IOP_MMHG: 14
IOP_METHOD: TONOPEN

## 2021-10-22 ASSESSMENT — SLIT LAMP EXAM - LIDS
COMMENTS: VERY DEEP SULCUS
COMMENTS: VERY DEEP SULCUS

## 2021-10-22 NOTE — NURSING NOTE
Chief Complaints and History of Present Illnesses   Patient presents with     Post Op (Ophthalmology) Right Eye     RIGHT EYE PHACOEMULSIFICATION, CATARACT, WITH STANDARD INTRAOCULAR LENS IMPLANT INSERTION  10/21/21     Chief Complaint(s) and History of Present Illness(es)     Post Op (Ophthalmology) Right Eye     Laterality: right eye    Course: stable    Associated symptoms: floaters.  Negative for eye pain, dryness and headache    Treatments tried: eye drops    Pain scale: 0/10    Comments: RIGHT EYE PHACOEMULSIFICATION, CATARACT, WITH STANDARD INTRAOCULAR LENS IMPLANT INSERTION  10/21/21              Comments     Ocular meds:  Prednisolone QID right eye  polymyxin QID right eye  Ketorolac QID right eye    Emmy Fonseca COT 10:54 AM October 22, 2021       Emmy Fonseca COT 10:50 AM October 22, 2021

## 2021-10-22 NOTE — TELEPHONE ENCOUNTER
Spoke with patient to schedule surgery with Dr. Condon    Surgery was scheduled on 11/4 at [facility]  Patient will have H&P at [facility]     Patient is aware a COVID-19 test is needed before their procedure. The test should be with-in 4 days of their procedure.   Test Details: Date 11/1 Location [facility]    Post-Op visit was scheduled on 11/5 AND 11/12  Patient is aware a / is needed day of surgery.   Surgery packet was mailed 10/22, patient has my direct contact information for any further questions.

## 2021-10-22 NOTE — PATIENT INSTRUCTIONS
"Drops to the right eye:  - Prednisolone (pink or white top, SHAKE WELL) - 4 times a day  - ofloxacin (tan top, antibiotic drop) - 4 times a day  - ketorolac (gray top) - 4 times a day    4 times a day = breakfast, lunch, dinner, bedtime  Wait a couple minutes between drops    The drops may sting when you put them in. You can use regular artificial tears/lubricant drops to help with any stinging (nothing that says \"get the red out\")    Aching/irriation/scratchiness is normal. You can take Tylenol or ibuprofen for mild pain. If having severe or sharp pain, or not better with Tylenol/ibuprofen please call.    I expect your vision to be blurry and gradually improving over the next couple weeks. If vision worsens, eye gets redder, new floaters, flashes in your vision, missing pieces of vision, etc. Please call    Wear your shield at night so you don't rub your eye in your sleep. Do not rub your eye.   It is OK to take a shower. Avoid getting soap/shampoo in your eyes. No baths, no hot tubs, no saunas, no swimming.    "

## 2021-10-22 NOTE — ANESTHESIA POSTPROCEDURE EVALUATION
Patient: Arjun Amaya    Procedure: Procedure(s):  RIGHT EYE PHACOEMULSIFICATION, CATARACT, WITH STANDARD INTRAOCULAR LENS IMPLANT INSERTION       Diagnosis:Combined form of age-related cataract, right eye [H25.811]  Diagnosis Additional Information: No value filed.    Anesthesia Type:  MAC    Note:  Disposition: Outpatient   Postop Pain Control: Uneventful            Sign Out: Well controlled pain   PONV: No   Neuro/Psych: Uneventful            Sign Out: Acceptable/Baseline neuro status   Airway/Respiratory: Uneventful            Sign Out: Acceptable/Baseline resp. status   CV/Hemodynamics: Uneventful            Sign Out: Acceptable CV status; No obvious hypovolemia; No obvious fluid overload   Other NRE: NONE   DID A NON-ROUTINE EVENT OCCUR? No           Last vitals:  Vitals Value Taken Time   /79 10/21/21 1030   Temp 36.4  C (97.6  F) 10/21/21 1030   Pulse 79 10/21/21 1030   Resp 14 10/21/21 1030   SpO2 95 % 10/21/21 1030       Electronically Signed By: Philipp Middleton MD  October 22, 2021  3:29 PM

## 2021-10-22 NOTE — PROGRESS NOTES
HPI:  Arjun Amaya is a 61 year old male presenting for s/p CE/IOL right eye 10/21/21    Very happy after surgery. He notes brighter vision right eye but blurred. No eye pain. He is eager for left eye surgery.  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    FH:  No known family history of blindness, glaucoma, macular degeneration    ASSESSMENT and PLAN:  1. 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 Combined form of age-related cataract, left eye  - visually significant; schedule for CE/IOL OS    3. 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 History of ptosis repair  - s/p brow ptosis repair with Dr. Porter 4/2021  - well healed and happy with results  - monitor      Follow up 1 week with DFE right eye or sooner PRN        -----------------------------------------------------------------------------------    Attestation:  Complete documentation of historical and exam elements from today's encounter can be  found in the full encounter summary report (not reduplicated in this progress note). I personally obtained the chief complaint(s) and history of present illness.  I confirmed and edited as necessary the review of systems, past medical/surgical history, family history, social history, and examination findings as documented by others; and I examined the patient myself. I personally reviewed the relevant tests, images, and reports as documented above.     I formulated and edited as necessary the assessment and plan and discussed the findings and management plan with the patient and family.      Thania Condon MD

## 2021-10-26 DIAGNOSIS — H25.812 COMBINED FORM OF AGE-RELATED CATARACT, LEFT EYE: Primary | ICD-10-CM

## 2021-10-26 RX ORDER — PREDNISOLONE ACETATE 10 MG/ML
1-2 SUSPENSION/ DROPS OPHTHALMIC 4 TIMES DAILY
Qty: 15 ML | Refills: 1 | Status: SHIPPED | OUTPATIENT
Start: 2021-10-26 | End: 2021-12-14

## 2021-10-26 RX ORDER — POLYMYXIN B SULFATE AND TRIMETHOPRIM 1; 10000 MG/ML; [USP'U]/ML
1-2 SOLUTION OPHTHALMIC 4 TIMES DAILY
Qty: 10 ML | Refills: 0 | Status: SHIPPED | OUTPATIENT
Start: 2021-10-26 | End: 2021-12-14

## 2021-10-26 RX ORDER — KETOROLAC TROMETHAMINE 5 MG/ML
1 SOLUTION OPHTHALMIC 4 TIMES DAILY
Qty: 10 ML | Refills: 0 | Status: SHIPPED | OUTPATIENT
Start: 2021-10-26 | End: 2021-12-14

## 2021-10-29 ENCOUNTER — OFFICE VISIT (OUTPATIENT)
Dept: OPHTHALMOLOGY | Facility: CLINIC | Age: 61
End: 2021-10-29
Attending: OPHTHALMOLOGY
Payer: COMMERCIAL

## 2021-10-29 DIAGNOSIS — Z96.1 PSEUDOPHAKIA, RIGHT EYE: ICD-10-CM

## 2021-10-29 DIAGNOSIS — H25.812 COMBINED FORM OF AGE-RELATED CATARACT, LEFT EYE: ICD-10-CM

## 2021-10-29 DIAGNOSIS — H47.392 CUP TO DISC ASYMMETRY, LEFT: ICD-10-CM

## 2021-10-29 DIAGNOSIS — Z98.890 HISTORY OF PTOSIS REPAIR: ICD-10-CM

## 2021-10-29 DIAGNOSIS — H35.351 CME (CYSTOID MACULAR EDEMA), RIGHT: Primary | ICD-10-CM

## 2021-10-29 PROCEDURE — G0463 HOSPITAL OUTPT CLINIC VISIT: HCPCS

## 2021-10-29 PROCEDURE — 99024 POSTOP FOLLOW-UP VISIT: CPT | Performed by: OPHTHALMOLOGY

## 2021-10-29 PROCEDURE — 92134 CPTRZ OPH DX IMG PST SGM RTA: CPT | Performed by: OPHTHALMOLOGY

## 2021-10-29 RX ORDER — KETOROLAC TROMETHAMINE 5 MG/ML
1 SOLUTION OPHTHALMIC 4 TIMES DAILY
Qty: 10 ML | Refills: 0 | Status: SHIPPED | OUTPATIENT
Start: 2021-10-29 | End: 2021-12-14

## 2021-10-29 RX ORDER — PREDNISOLONE ACETATE 10 MG/ML
1 SUSPENSION/ DROPS OPHTHALMIC 4 TIMES DAILY
Qty: 15 ML | Refills: 1 | Status: SHIPPED | OUTPATIENT
Start: 2021-10-29 | End: 2021-12-14

## 2021-10-29 ASSESSMENT — VISUAL ACUITY
OD_SC: 20/125
METHOD: SNELLEN - LINEAR
OD_PH_SC: 20/30
OS_CC: 20/20
OS_CC+: -2
OD_PH_SC+: +2

## 2021-10-29 ASSESSMENT — SLIT LAMP EXAM - LIDS
COMMENTS: VERY DEEP SULCUS
COMMENTS: VERY DEEP SULCUS

## 2021-10-29 ASSESSMENT — REFRACTION
OD_SPHERE: -0.25
OD_AXIS: 025
OD_CYLINDER: +2.00

## 2021-10-29 ASSESSMENT — CONF VISUAL FIELD
OS_NORMAL: 1
OD_NORMAL: 1

## 2021-10-29 ASSESSMENT — TONOMETRY
IOP_METHOD: TONOPEN
OS_IOP_MMHG: 15
OD_IOP_MMHG: 15

## 2021-10-29 ASSESSMENT — CUP TO DISC RATIO: OD_RATIO: 0.4

## 2021-10-29 NOTE — NURSING NOTE
"Chief Complaints and History of Present Illnesses   Patient presents with     Follow Up     Chief Complaint(s) and History of Present Illness(es)     Follow Up     Laterality: right eye    Course: stable    Associated symptoms: double vision and photophobia.  Negative for eye pain, headache, flashes and floaters              Comments     Pt 1 week s/p PCIOL right eye. Pt having trouble seeing. He notes a 'blind spot\" from his eyes closing. Pt has mild light sensitivity. He is unable to focus.  Pt using:  Polymyxin B-Trimethoprim  Ketoralac  Prednisolone  CARLENE JENNINGS 11:00 AM October 29, 2021                   "

## 2021-10-29 NOTE — PATIENT INSTRUCTIONS
RIGHT EYE:  - stop polytrim (polymixin-trimethoprim) (clear or white cap clear bottle)  - continue ketorolac (gray cap) 4 times a day   - continue prednisolone (white or pink cap, SHAKE WELL) 4 times a day for 1 more week   - then decrease to 3 times a day    You can use tear drops throughout the day as needed

## 2021-10-29 NOTE — PROGRESS NOTES
"HPI:  Arjun Amaya is a 61 year old male presenting for s/p CE/IOL right eye 10/21/21    Has noticed a temporal crescent where he cannot see that goes away if he uses his fingers to open his eyelids further. He  Has also noted that temporal lashes are touching each otherand he feels like his lids are droopier. No eye pain. Has a little tearing but no other discharge. Vision is blurred in the right eye and now feels like the left eye is overworked.  No flashes/floaters.  \"even with the problems that I'm having there is definitely a huge improvement. 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    FH:  No known family history of blindness, glaucoma, macular degeneration    ASSESSMENT and PLAN:  1. Pseudophakia, right eye  - s/p CE/IOL right eye 10/21/21  - doing well POW1 CRx to 20/30 with significant astigmatism; still noting some shadowing; retina attached, IOP OK, incisions sealed, 1+ AC cell, blunted FLR without edema on OCT but with slightly thicker outer retina and overall thicker than left eye  - discussed slightly thicker outer retina right eye may be contributing to decreased [facility] as well  - discussed old glasses are no longer correct Rx and with significant astigmatism (no toric lens per patient decision) will need correction postop and he understands  - reviewed postop and return precautions including no swimming/tub baths/saunas, no eye rubbing, shield at night; he understands to call/come in for decreased vision, increased pain, increased redness, purulent discharge, new flashes/floaters etc    --> stop polytrim  --> continue ketorolac QID  --> continue " PF QID x1 week, then taper to TID    2. Combined form of age-related cataract, left eye  - visually significant; scheduled for CE/IOL left eye 11/4/21  - We discussed the risks, benefits and alternatives of cataract surgery, including risk of bleeding, infection, postoperative changes in intraocular pressure, postoperative inflammation, possibility of retinal detachment or vision loss.  Discussed need for follow up appointments after surgery and the need for postoperative drops.  Informed consent was obtained.  The patient decided to proceed with CE/IOL OS.    We discussed lens options and refractive target. We discussed that by aiming for distance, will need glasses for near. He is not interested in toric lenses  Target: -0.5    Dilates to: 8 mm  Alpha blockers/Flomax: None  Trauma/Pseudoxfoliation: None  Fuchs dystrophy/guttae: None    Diabetes: Yes  Anticoagulation: None    Proceed with CE/IOL OS.    Surgical plan:  Topical  Trypan?  Post-op acular  Tightly tape head; history of seizures and anxious about moving during case    3. 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 History of ptosis repair  - s/p brow ptosis repair with Dr. Porter 4/2021  - well healed; has been noticing increased ptosis right eye with temporal lashes touching and using his fingers to open right eye more  - address after #1-2      Follow up 1 week no dilation or sooner PRN        -----------------------------------------------------------------------------------    Attestation:  Complete documentation of historical and exam elements from today's encounter can be found in the full encounter summary report (not reduplicated in this progress note). I personally obtained the chief complaint(s) and history of present illness.  I confirmed and edited as necessary the review of systems, past medical/surgical history, family history, social history, and examination findings as documented by  others; and I examined the patient myself. I personally reviewed the relevant tests, images, and reports as documented above.     I formulated and edited as necessary the assessment and plan and discussed the findings and management plan with the patient and family.      Thania Condon MD

## 2021-11-03 ENCOUNTER — LAB (OUTPATIENT)
Dept: LAB | Facility: CLINIC | Age: 61
End: 2021-11-03
Attending: OPHTHALMOLOGY

## 2021-11-03 ENCOUNTER — ANESTHESIA EVENT (OUTPATIENT)
Dept: SURGERY | Facility: AMBULATORY SURGERY CENTER | Age: 61
End: 2021-11-03
Payer: COMMERCIAL

## 2021-11-03 DIAGNOSIS — Z11.59 ENCOUNTER FOR SCREENING FOR OTHER VIRAL DISEASES: ICD-10-CM

## 2021-11-03 LAB — SARS-COV-2 RNA RESP QL NAA+PROBE: NEGATIVE

## 2021-11-03 PROCEDURE — U0005 INFEC AGEN DETEC AMPLI PROBE: HCPCS | Mod: 90 | Performed by: PATHOLOGY

## 2021-11-03 PROCEDURE — U0003 INFECTIOUS AGENT DETECTION BY NUCLEIC ACID (DNA OR RNA); SEVERE ACUTE RESPIRATORY SYNDROME CORONAVIRUS 2 (SARS-COV-2) (CORONAVIRUS DISEASE [COVID-19]), AMPLIFIED PROBE TECHNIQUE, MAKING USE OF HIGH THROUGHPUT TECHNOLOGIES AS DESCRIBED BY CMS-2020-01-R: HCPCS | Mod: 90 | Performed by: PATHOLOGY

## 2021-11-04 ENCOUNTER — ANESTHESIA (OUTPATIENT)
Dept: SURGERY | Facility: AMBULATORY SURGERY CENTER | Age: 61
End: 2021-11-04
Payer: COMMERCIAL

## 2021-11-04 ENCOUNTER — HOSPITAL ENCOUNTER (OUTPATIENT)
Facility: AMBULATORY SURGERY CENTER | Age: 61
End: 2021-11-04
Attending: OPHTHALMOLOGY
Payer: COMMERCIAL

## 2021-11-04 VITALS
HEART RATE: 80 BPM | RESPIRATION RATE: 16 BRPM | OXYGEN SATURATION: 94 % | HEIGHT: 73 IN | SYSTOLIC BLOOD PRESSURE: 118 MMHG | WEIGHT: 280 LBS | TEMPERATURE: 97 F | DIASTOLIC BLOOD PRESSURE: 80 MMHG | BODY MASS INDEX: 37.11 KG/M2

## 2021-11-04 DIAGNOSIS — H25.812 COMBINED FORM OF AGE-RELATED CATARACT, LEFT EYE: ICD-10-CM

## 2021-11-04 PROCEDURE — 66984 XCAPSL CTRC RMVL W/O ECP: CPT | Mod: 79 | Performed by: OPHTHALMOLOGY

## 2021-11-04 PROCEDURE — 66984 XCAPSL CTRC RMVL W/O ECP: CPT | Mod: LT

## 2021-11-04 DEVICE — EYE IMP IOL ALCON PCL SN60WF ACRYSOF IQ 18.0: Type: IMPLANTABLE DEVICE | Site: EYE | Status: FUNCTIONAL

## 2021-11-04 RX ORDER — SODIUM CHLORIDE, SODIUM LACTATE, POTASSIUM CHLORIDE, CALCIUM CHLORIDE 600; 310; 30; 20 MG/100ML; MG/100ML; MG/100ML; MG/100ML
INJECTION, SOLUTION INTRAVENOUS CONTINUOUS
Status: DISCONTINUED | OUTPATIENT
Start: 2021-11-04 | End: 2021-11-05 | Stop reason: HOSPADM

## 2021-11-04 RX ORDER — FENTANYL CITRATE 50 UG/ML
25 INJECTION, SOLUTION INTRAMUSCULAR; INTRAVENOUS
Status: DISCONTINUED | OUTPATIENT
Start: 2021-11-04 | End: 2021-11-05 | Stop reason: HOSPADM

## 2021-11-04 RX ORDER — LIDOCAINE HYDROCHLORIDE 10 MG/ML
INJECTION, SOLUTION EPIDURAL; INFILTRATION; INTRACAUDAL; PERINEURAL PRN
Status: DISCONTINUED | OUTPATIENT
Start: 2021-11-04 | End: 2021-11-04 | Stop reason: HOSPADM

## 2021-11-04 RX ORDER — LIDOCAINE 40 MG/G
CREAM TOPICAL
Status: DISCONTINUED | OUTPATIENT
Start: 2021-11-04 | End: 2021-11-04 | Stop reason: HOSPADM

## 2021-11-04 RX ORDER — FENTANYL CITRATE 50 UG/ML
25-50 INJECTION, SOLUTION INTRAMUSCULAR; INTRAVENOUS EVERY 5 MIN PRN
Status: DISCONTINUED | OUTPATIENT
Start: 2021-11-04 | End: 2021-11-04 | Stop reason: HOSPADM

## 2021-11-04 RX ORDER — MEPERIDINE HYDROCHLORIDE 25 MG/ML
12.5 INJECTION INTRAMUSCULAR; INTRAVENOUS; SUBCUTANEOUS
Status: DISCONTINUED | OUTPATIENT
Start: 2021-11-04 | End: 2021-11-05 | Stop reason: HOSPADM

## 2021-11-04 RX ORDER — ONDANSETRON 4 MG/1
4 TABLET, ORALLY DISINTEGRATING ORAL EVERY 30 MIN PRN
Status: DISCONTINUED | OUTPATIENT
Start: 2021-11-04 | End: 2021-11-05 | Stop reason: HOSPADM

## 2021-11-04 RX ORDER — TETRACAINE HYDROCHLORIDE 5 MG/ML
SOLUTION OPHTHALMIC PRN
Status: DISCONTINUED | OUTPATIENT
Start: 2021-11-04 | End: 2021-11-04 | Stop reason: HOSPADM

## 2021-11-04 RX ORDER — KETOROLAC TROMETHAMINE 30 MG/ML
15 INJECTION, SOLUTION INTRAMUSCULAR; INTRAVENOUS EVERY 6 HOURS PRN
Status: DISCONTINUED | OUTPATIENT
Start: 2021-11-04 | End: 2021-11-05 | Stop reason: HOSPADM

## 2021-11-04 RX ORDER — SODIUM CHLORIDE, SODIUM LACTATE, POTASSIUM CHLORIDE, CALCIUM CHLORIDE 600; 310; 30; 20 MG/100ML; MG/100ML; MG/100ML; MG/100ML
INJECTION, SOLUTION INTRAVENOUS CONTINUOUS
Status: DISCONTINUED | OUTPATIENT
Start: 2021-11-04 | End: 2021-11-04 | Stop reason: HOSPADM

## 2021-11-04 RX ORDER — ALBUTEROL SULFATE 0.83 MG/ML
2.5 SOLUTION RESPIRATORY (INHALATION) EVERY 4 HOURS PRN
Status: DISCONTINUED | OUTPATIENT
Start: 2021-11-04 | End: 2021-11-04 | Stop reason: HOSPADM

## 2021-11-04 RX ORDER — FENTANYL CITRATE 50 UG/ML
INJECTION, SOLUTION INTRAMUSCULAR; INTRAVENOUS PRN
Status: DISCONTINUED | OUTPATIENT
Start: 2021-11-04 | End: 2021-11-04

## 2021-11-04 RX ORDER — ONDANSETRON 2 MG/ML
4 INJECTION INTRAMUSCULAR; INTRAVENOUS EVERY 30 MIN PRN
Status: DISCONTINUED | OUTPATIENT
Start: 2021-11-04 | End: 2021-11-05 | Stop reason: HOSPADM

## 2021-11-04 RX ORDER — TOBRAMYCIN 3 MG/ML
1 SOLUTION/ DROPS OPHTHALMIC
Status: COMPLETED | OUTPATIENT
Start: 2021-11-04 | End: 2021-11-04

## 2021-11-04 RX ORDER — POLYMYXIN B SULFATE AND TRIMETHOPRIM 10000; 1 1/ML; MG/ML
SOLUTION OPHTHALMIC PRN
Status: DISCONTINUED | OUTPATIENT
Start: 2021-11-04 | End: 2021-11-04 | Stop reason: HOSPADM

## 2021-11-04 RX ORDER — LORAZEPAM 2 MG/ML
.5-1 INJECTION INTRAMUSCULAR
Status: DISCONTINUED | OUTPATIENT
Start: 2021-11-04 | End: 2021-11-04 | Stop reason: HOSPADM

## 2021-11-04 RX ORDER — SODIUM CHLORIDE, SODIUM LACTATE, POTASSIUM CHLORIDE, CALCIUM CHLORIDE 600; 310; 30; 20 MG/100ML; MG/100ML; MG/100ML; MG/100ML
INJECTION, SOLUTION INTRAVENOUS CONTINUOUS PRN
Status: DISCONTINUED | OUTPATIENT
Start: 2021-11-04 | End: 2021-11-04

## 2021-11-04 RX ORDER — OXYCODONE HYDROCHLORIDE 5 MG/1
5 TABLET ORAL EVERY 4 HOURS PRN
Status: DISCONTINUED | OUTPATIENT
Start: 2021-11-04 | End: 2021-11-05 | Stop reason: HOSPADM

## 2021-11-04 RX ORDER — PROPARACAINE HYDROCHLORIDE 5 MG/ML
1 SOLUTION/ DROPS OPHTHALMIC ONCE
Status: COMPLETED | OUTPATIENT
Start: 2021-11-04 | End: 2021-11-04

## 2021-11-04 RX ORDER — ACETAMINOPHEN 325 MG/1
975 TABLET ORAL ONCE
Status: DISCONTINUED | OUTPATIENT
Start: 2021-11-04 | End: 2021-11-04 | Stop reason: HOSPADM

## 2021-11-04 RX ORDER — PREDNISOLONE ACETATE 1 %
SUSPENSION, DROPS(FINAL DOSAGE FORM)(ML) OPHTHALMIC (EYE) PRN
Status: DISCONTINUED | OUTPATIENT
Start: 2021-11-04 | End: 2021-11-04 | Stop reason: HOSPADM

## 2021-11-04 RX ORDER — CYCLOPENTOLAT/TROPIC/PHENYLEPH 1%-1%-2.5%
1 DROPS (EA) OPHTHALMIC (EYE)
Status: COMPLETED | OUTPATIENT
Start: 2021-11-04 | End: 2021-11-04

## 2021-11-04 RX ORDER — BALANCED SALT SOLUTION 6.4; .75; .48; .3; 3.9; 1.7 MG/ML; MG/ML; MG/ML; MG/ML; MG/ML; MG/ML
SOLUTION OPHTHALMIC PRN
Status: DISCONTINUED | OUTPATIENT
Start: 2021-11-04 | End: 2021-11-04 | Stop reason: HOSPADM

## 2021-11-04 RX ORDER — HYDROMORPHONE HYDROCHLORIDE 1 MG/ML
.2-.4 INJECTION, SOLUTION INTRAMUSCULAR; INTRAVENOUS; SUBCUTANEOUS EVERY 5 MIN PRN
Status: DISCONTINUED | OUTPATIENT
Start: 2021-11-04 | End: 2021-11-04 | Stop reason: HOSPADM

## 2021-11-04 RX ADMIN — FENTANYL CITRATE 50 MCG: 50 INJECTION, SOLUTION INTRAMUSCULAR; INTRAVENOUS at 10:21

## 2021-11-04 RX ADMIN — Medication 1 DROP: at 09:09

## 2021-11-04 RX ADMIN — Medication 1 DROP: at 09:24

## 2021-11-04 RX ADMIN — TOBRAMYCIN 1 DROP: 3 SOLUTION/ DROPS OPHTHALMIC at 09:32

## 2021-11-04 RX ADMIN — SODIUM CHLORIDE, SODIUM LACTATE, POTASSIUM CHLORIDE, CALCIUM CHLORIDE: 600; 310; 30; 20 INJECTION, SOLUTION INTRAVENOUS at 10:03

## 2021-11-04 RX ADMIN — Medication 1 DROP: at 09:19

## 2021-11-04 RX ADMIN — PROPARACAINE HYDROCHLORIDE 1 DROP: 5 SOLUTION/ DROPS OPHTHALMIC at 09:05

## 2021-11-04 RX ADMIN — TOBRAMYCIN 1 DROP: 3 SOLUTION/ DROPS OPHTHALMIC at 09:19

## 2021-11-04 RX ADMIN — SODIUM CHLORIDE, SODIUM LACTATE, POTASSIUM CHLORIDE, CALCIUM CHLORIDE: 600; 310; 30; 20 INJECTION, SOLUTION INTRAVENOUS at 09:35

## 2021-11-04 RX ADMIN — FENTANYL CITRATE 50 MCG: 50 INJECTION, SOLUTION INTRAMUSCULAR; INTRAVENOUS at 10:05

## 2021-11-04 RX ADMIN — TOBRAMYCIN 1 DROP: 3 SOLUTION/ DROPS OPHTHALMIC at 09:24

## 2021-11-04 ASSESSMENT — ENCOUNTER SYMPTOMS: SEIZURES: 1

## 2021-11-04 ASSESSMENT — MIFFLIN-ST. JEOR: SCORE: 2138.95

## 2021-11-04 NOTE — DISCHARGE INSTRUCTIONS
[facility]  Home Care Following Anesthesia  For 24 hours after surgery:  1. Get plenty of rest.  A responsible adult must stay with you for at least 24 hours after you leave the surgery center.  2. Do not drive or use heavy equipment.  If you have weakness or tingling, don't drive or use heavy equipment until this feeling goes away.   3. Do not drink alcohol.   4. Avoid strenuous or risky activities.  Ask for help when climbing stairs.  5. You may feel lightheaded.  IF so, sit for a few minutes before standing.  Have someone help you get up.   6. If you have nausea (feel sick to your stomach): Drink only clear liquids such as apple juice, ginger ale, broth or 7-Up.  Rest may also help.  Be sure to drink enough fluids.  Move to a regular diet as you feel able.   7. You may have a slight fever.  Call the doctor if your fever is over 100 F (37.7 C) (taken under the tongue) or lasts longer than 24 hours.  8. You may have a dry mouth, a sore throat, muscle aches or trouble sleeping. These should go away after 24 hours.  9. Do not make important or legal decisions.   10. It is recommended to avoid smoking.               Tips for taking pain medications  To get the best pain relief possible, remember these points:    Take pain medications as directed, before pain becomes severe.    Pain medication can upset your stomach: taking it with food may help.    Constipation is a common side effect of pain medication. Drink plenty of  fluids.    Eat foods high in fiber. Take a stool softener if recommended by your doctor or pharmacist.    Do not drink alcohol, drive or operate machinery while taking pain medications.    Ask about other ways to control pain, such as with heat, ice or relaxation.    Tylenol/Acetaminophen Consumption  To help encourage the safe use of acetaminophen, the makers of TYLENOL  have lowered the maximum daily dose for single-ingredient Extra Strength TYLENOL   (acetaminophen) products sold in the [place] from 8 pills per day (4,000 mg) to 6 pills per day (3,000 mg). The dosing interval has also changed from 2 pills every 4-6 hours to 2 pills every 6 hours.    If you feel your pain relief is insufficient, you may take Tylenol/Acetaminophen in addition to your narcotic pain medication.     Be careful not to exceed 3,000 mg of Tylenol/Acetaminophen in a 24 hour period from all sources.    If you are taking extra strength Tylenol/acetaminophen (500 mg), the maximum dose is 6 tablets in 24 hours.    If you are taking regular strength acetaminophen (325 mg), the maximum dose is 9 tablets in 24 hours.    Call a doctor for any of the followin. Signs of infection (fever, growing tenderness at the surgery site, a large amount of drainage or bleeding, severe pain, foul-smelling drainage, redness, swelling).  2. It has been over 8 to 10 hours since surgery and you are still not able to urinate (pass water).  3. Headache for over 24 hours.  4. Numbness, tingling or weakness the day after surgery (if you had spinal anesthesia).  5. Signs of Covid-19 infection (temperature over 100 degrees, shortness of breath, cough, loss of taste/smell, generalized body aches, persistent headache, chills, sore throat, nausea/vomiting/diarrhea)  Your doctor is:       Dr. Thania Condon, Ophthalmology: [phone]               Or dial [phone] and ask for the resident on call for:  Ophthalmology  For emergency care, call the:  [place] [facility]:  [phone] (TTY for hearing impaired: [phone])

## 2021-11-04 NOTE — ANESTHESIA CARE TRANSFER NOTE
Patient: Arjun Amaya    Procedure: Procedure(s):  PHACOEMULSIFICATION, CATARACT, WITH STANDARD INTRAOCULAR LENS IMPLANT INSERTION       Diagnosis: Combined form of age-related cataract, left eye [H25.812]  Diagnosis Additional Information: No value filed.    Anesthesia Type:   MAC     Note:    Oropharynx: oropharynx clear of all foreign objects and spontaneously breathing  Level of Consciousness: awake      Independent Airway: airway patency satisfactory and stable  Dentition: dentition unchanged  Vital Signs Stable: post-procedure vital signs reviewed and stable  Report to RN Given: handoff report given  Patient transferred to: Phase II    Handoff Report: Identifed the Patient, Identified the Reponsible Provider, Reviewed the pertinent medical history, Discussed the surgical course, Reviewed Intra-OP anesthesia mangement and issues during anesthesia, Set expectations for post-procedure period and Allowed opportunity for questions and acknowledgement of understanding      Vitals:  Vitals Value Taken Time   /79 11/04/21 1037   Temp 35.9  C (96.6  F) 11/04/21 1037   Pulse 80 11/04/21 1037   Resp 16 11/04/21 1037   SpO2 93 % 11/04/21 1037       Electronically Signed By: ALEJANDRO Tejada CRNA  November 4, 2021  10:40 AM

## 2021-11-04 NOTE — OP NOTE
Pre-operative Diagnosis: Combined forms of age-related cataract left eye    Post-operative Diagnosis: Combined forms of age-related cataract left eye    Procedure: Phacoemulsification cataract extraction with intraocular lens insertion left eye    Surgeon: Thania Condon MD    Estimated Blood Loss: 0mL    Specimens: None    Implant: [id number] +18.0D    SN [id number]    The patient was brought into the Operating Room where an intravenous line was started and EKG monitor leads were placed.  Local anesthesia was achieved using tetracaine drops in the left eye.  The left eye was prepped and draped in the usual sterile manner for ocular surgery including the use of 5% Betadine irrigation of the conjunctival fornices.  A Kevin speculum was inserted.  A Supersharp blade was used to create a paracentesis.  Next, 0.5 mL of lidocaine was instilled in the anterior chamber followed by Viscoat.  A 2.4 mm keratome was used to fashion a triplanar main incision.  A 360 degree continuous curvilinear capsulorrhexis was fashioned with a cystotome and Utrata forceps.  Hydrodissection was performed using balanced salt solution on a 30 gauge cannula. Phacoemulsification was performed.  Residual cortical material was removed using the irrigation and aspiration handpiece.  The capsular bag was filled with Provisc.  The lens was inspected and found to be without flaw. The [id number] +18.0D implant was  delivered into the capsular bag, and was centered well.  Viscoelastic was removed with the irrigation/aspiration handpiece. The anterior chamber was reformed with balanced salt solution  The wound and paracentesis were hydrated.  All incisions were tested and found to be watertight.  Polytrim, ketorolac, and prednisolone drops were placed on the eye.  The speculum and drapes were removed.  The eye was cleaned and a shield was put in place.  The patient tolerated the procedure well and left the Operating Room in good condition.

## 2021-11-04 NOTE — ANESTHESIA PREPROCEDURE EVALUATION
Anesthesia Pre-Procedure Evaluation    Patient: Arjun Amaya   MRN: [id number] : 1960        Preoperative Diagnosis: Combined form of age-related cataract, left eye [H25.812]    Procedure : Procedure(s):  PHACOEMULSIFICATION, CATARACT, WITH STANDARD INTRAOCULAR LENS IMPLANT INSERTION          No past medical history on file.   Past Surgical History:   Procedure Laterality Date     PHACOEMULSIFICATION WITH STANDARD INTRAOCULAR LENS IMPLANT Right 10/21/2021    Procedure: RIGHT EYE PHACOEMULSIFICATION, CATARACT, WITH STANDARD INTRAOCULAR LENS IMPLANT INSERTION;  Surgeon: Thania Condon MD;  [place] [facility] [place]     REPAIR PTOSIS BILATERAL Bilateral 2021    Procedure: Bilateral upper lid ptosis repair;  Surgeon: Jayce Porter MD;  [place] [facility] [place] [facility] [place]      Allergies   Allergen Reactions     Ciprofloxacin Rash     Rash    Rash     Adderall      Other reaction(s): Psychotic disorder     Amphetamine-Dextroamphetamine Other (See Comments)     Other reaction(s): Psychotic disorder     Contrast Dye GI Disturbance     Other reaction(s): Unknown Reaction     Dextroamphetamine Other (See Comments)     Other reaction(s): Hallucination  Other reaction(s): Hallucinations  Other reaction(s): Hallucination       Valproic Acid Nausea and Vomiting      Social History     Tobacco Use     Smoking status: Never Smoker     Smokeless tobacco: Never Used   Substance Use Topics     Alcohol use: Not on file      Wt Readings from Last 1 Encounters:   21 127 kg (280 lb)        Anesthesia Evaluation   Pt has had prior anesthetic. Type: General.    No history of anesthetic complications       ROS/MED HX  ENT/Pulmonary:    (-) asthma   Neurologic: Comment: History of traumatic brain injury which led to the seizures.    (+) seizures, last seizure: 1.5 years ago,     Cardiovascular:     (+) hypertension-range: 110/90/ ----    METS/Exercise Tolerance: 3 - Able to walk 1-2 blocks without stopping Comment: Goes up 1 flight of  stairs at home, otherwise not very active.    Hematologic:       Musculoskeletal:       GI/Hepatic:     (+) GERD (With certain foods. No symptoms recently. ), Asymptomatic on medication,     Renal/Genitourinary:       Endo:     (+) thyroid problem,  hyperthyroidism, Obesity,     Psychiatric/Substance Use:       Infectious Disease:       Malignancy:       Other:            Physical Exam    Airway        Mallampati: II   TM distance: > 3 FB   Neck ROM: full   Mouth opening: > 3 cm    Respiratory Devices and Support         Dental  no notable dental history         Cardiovascular   cardiovascular exam normal          Pulmonary   pulmonary exam normal                OUTSIDE LABS:  CBC: No results found for: WBC, HGB, HCT, PLT  BMP: No results found for: NA, POTASSIUM, CHLORIDE, CO2, BUN, CR, GLC  COAGS: No results found for: PTT, INR, FIBR  POC: No results found for: BGM, HCG, HCGS  HEPATIC: No results found for: ALBUMIN, PROTTOTAL, ALT, AST, GGT, ALKPHOS, BILITOTAL, BILIDIRECT, CLEMENTINA  OTHER: No results found for: PH, LACT, A1C, RAMONA, PHOS, MAG, LIPASE, AMYLASE, TSH, T4, T3, CRP, SED    Anesthesia Plan    ASA Status:  3   NPO Status:  NPO Appropriate    Anesthesia Type: MAC.     - Reason for MAC: straight local not clinically adequate   Induction: Intravenous.   Maintenance: TIVA.        Consents    Anesthesia Plan(s) and associated risks, benefits, and realistic alternatives discussed. Questions answered and patient/representative(s) expressed understanding.     - Discussed with:  Patient         Postoperative Care    Pain management: Multi-modal analgesia.        Comments:                JANICE CORNELIUS MD

## 2021-11-04 NOTE — ANESTHESIA POSTPROCEDURE EVALUATION
Patient: Arjun Amaya    Procedure: Procedure(s):  PHACOEMULSIFICATION, CATARACT, WITH STANDARD INTRAOCULAR LENS IMPLANT INSERTION       Diagnosis:Combined form of age-related cataract, left eye [H25.812]  Diagnosis Additional Information: No value filed.    Anesthesia Type:  MAC    Note:  Disposition: Outpatient   Postop Pain Control: Uneventful            Sign Out: Well controlled pain   PONV: No   Neuro/Psych: Uneventful            Sign Out: Acceptable/Baseline neuro status   Airway/Respiratory: Uneventful            Sign Out: Acceptable/Baseline resp. status   CV/Hemodynamics: Uneventful            Sign Out: Acceptable CV status; No obvious hypovolemia; No obvious fluid overload   Other NRE: NONE   DID A NON-ROUTINE EVENT OCCUR? No           Last vitals:  Vitals Value Taken Time   /80 11/04/21 1046   Temp 36.1  C (97  F) 11/04/21 1046   Pulse 80 11/04/21 1037   Resp 16 11/04/21 1046   SpO2 94 % 11/04/21 1046       Electronically Signed By: JANICE CORNELIUS MD  November 4, 2021  1:51 PM

## 2021-11-04 NOTE — BRIEF OP NOTE
[facility] Brief Operative Note    Pre-operative diagnosis: Combined form of age-related cataract, left eye [H25.812]   Post-operative diagnosis Combined form of age-related cataract left eye   Procedure: Procedure(s):  PHACOEMULSIFICATION, CATARACT, WITH STANDARD INTRAOCULAR LENS IMPLANT INSERTION   Surgeon(s): Surgeon(s) and Role:     * Thania Condon MD - Primary   Estimated blood loss: 0 mL    Specimens: * None   Findings: See full operative note

## 2021-11-05 ENCOUNTER — OFFICE VISIT (OUTPATIENT)
Dept: OPHTHALMOLOGY | Facility: CLINIC | Age: 61
End: 2021-11-05
Attending: OPHTHALMOLOGY
Payer: COMMERCIAL

## 2021-11-05 DIAGNOSIS — Z98.890 HISTORY OF PTOSIS REPAIR: ICD-10-CM

## 2021-11-05 DIAGNOSIS — Z96.1 PSEUDOPHAKIA OF BOTH EYES: Primary | ICD-10-CM

## 2021-11-05 PROCEDURE — 99024 POSTOP FOLLOW-UP VISIT: CPT | Performed by: OPHTHALMOLOGY

## 2021-11-05 PROCEDURE — G0463 HOSPITAL OUTPT CLINIC VISIT: HCPCS

## 2021-11-05 ASSESSMENT — VISUAL ACUITY
OD_PH_SC+: -1
OS_PH_SC: 20/20
OS_SC: 20/30
OD_PH_SC: 20/25
METHOD: SNELLEN - LINEAR
OD_SC: 20/200
OS_SC+: -2
OS_PH_SC+: -3

## 2021-11-05 ASSESSMENT — SLIT LAMP EXAM - LIDS
COMMENTS: VERY DEEP SULCUS
COMMENTS: VERY DEEP SULCUS

## 2021-11-05 ASSESSMENT — TONOMETRY
IOP_METHOD: TONOPEN
OS_IOP_MMHG: 24
OD_IOP_MMHG: 16

## 2021-11-05 NOTE — NURSING NOTE
"Chief Complaints and History of Present Illnesses   Patient presents with     Post Op (Ophthalmology) Left Eye     Chief Complaint(s) and History of Present Illness(es)     Post Op (Ophthalmology) Left Eye     Laterality: left eye    Onset: sudden    Severity: moderate    Associated symptoms: Negative for eye pain    Pain scale: 0/10              Comments     Arjun is here one day post cataract surgery LE. He says LE feels good. He usually sees with LE, so his overall vision is \"off\" due to RE working more.     Cedric Lauren COT 9:57 AM November 5, 2021                   "

## 2021-11-05 NOTE — PATIENT INSTRUCTIONS
"RIGHT EYE:  - continue prednisolone (white; SHAKE WELL) 2 times a day  - continue ketorolac (gray) 4 times a day until the bottle runs out    LEFT EYE:  - Prednisolone (pink or white top, SHAKE WELL) - 4 times a day  - Polymixin-neomycin (antibiotic drop) - 4 times a day  - ketorolac (gray top) - 4 times a day    4 times a day = breakfast, lunch, dinner, bedtime  Wait a couple minutes between drops    The drops may sting when you put them in. You can use regular artificial tears/lubricant drops to help with any stinging (nothing that says \"get the red out\")    Aching/irriation/scratchiness is normal. You can take Tylenol or ibuprofen for mild pain. If having severe or sharp pain, or not better with Tylenol/ibuprofen please call.    I expect your vision to be blurry and gradually improving over the next couple weeks. If vision worsens, eye gets redder, new floaters, flashes in your vision, missing pieces of vision, etc. Please call [phone]    Wear your shield at night so you don't rub your eye in your sleep. Do not rub your eye.   It is OK to take a shower. Avoid getting soap/shampoo in your eyes. No baths, no hot tubs, no saunas, no swimming.    "

## 2021-11-05 NOTE — PROGRESS NOTES
"HPI:  Arjun Amaya is a 61 year old male presenting for s/p CE/IOL right eye 10/21/21 and s/p CE/IOL left eye 11/4/21    He notes that yesterday he felt like his right eye was overworking but now better and he can see very clearly. The \"cellular wall\" he previously saw has resolved. No eye pain.  No flashes/floaters.    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    FH:  No known family history of blindness, glaucoma, macular degeneration    ASSESSMENT and PLAN:  1. Pseudophakia, right eye  - s/p CE/IOL right eye 10/21/21  - significant astigmatism but no toric lens per patient because of cost concerns  - [facility]; still with 1+ AC cell  - discussed again will need correction for astigmatism and he understands    --> continue ketorolac QID until bottle runs out  --> continue PF BID    2. Pseudophakia left eye  - s/p CE/IOL left eye 11/4/21  - [facility]OD[facility] 20[facility] ([facility] 20/20), IOP 24, low 3+ AC cell, Carmen negative  - reviewed postop and return precautions including no swimming/tub baths/saunas, no eye rubbing, shield at night; he understands to call/come in for decreased vision, increased pain, increased redness, purulent discharge, new flashes/floaters etc    --> PF QID left eye  --> polytrim QID left eye  --> ketorolac QID left eye    Follow up in 1 week with [facility] OS    3. 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 History of ptosis repair  - s/p brow ptosis repair with Dr. Porter " 4/2021  - well healed; has been noticing increased ptosis right eye with temporal lashes touching and using his fingers to open right eye more  - address after #1-2      Follow up 1 week with dilation left eye; or sooner PRN        -----------------------------------------------------------------------------------    Attestation:  Complete documentation of historical and exam elements from today's encounter can be found in the full encounter summary report (not reduplicated in this progress note). I personally obtained the chief complaint(s) and history of present illness.  I confirmed and edited as necessary the review of systems, past medical/surgical history, family history, social history, and examination findings as documented by others; and I examined the patient myself. I personally reviewed the relevant tests, images, and reports as documented above.     I formulated and edited as necessary the assessment and plan and discussed the findings and management plan with the patient and family.      Thania Condon MD

## 2021-11-09 NOTE — PROGRESS NOTES
HPI:  Arjun Amaya is a 61 year old male presenting for s/p CE/IOL right eye 10/21/21 and s/p CE/IOL left eye 11/4/21    Still very blurred right eye with uncorrected astigmatism. With both eyes open it throws off the left eye as well. Much clearer vision left eye with closing right eye. Can see clearly through pinholes. No eye pain. No flashes/floaters.  He is also still seeing decreased peripheral vision that improves completely if he lifts his eyelids/brows with his fingers. Also the  Lashes have started to touch again. 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    FH:  No known family history of blindness, glaucoma, macular degeneration    ASSESSMENT and PLAN:  1. Pseudophakia, right eye  - s/p CE/IOL right eye 10/21/21  - significant astigmatism but no toric lens per patient because of cost concerns  - [facility], IOP 13; AC cell looks pigmented  - discussed again will need correction for astigmatism and he understands    --> stop ketorolac  --> taper PF to daily  x1 week, then stop    2. Pseudophakia left eye  - s/p CE/IOL left eye 11/4/21  - [facility], IOP 15, rare+ AC cell, incisions sealed, retina attached  - reviewed postop and return precautions including no swimming/tub baths/saunas, no eye rubbing, shield at night; he understands to call/come in for decreased vision, increased pain, increased redness, purulent discharge, new flashes/floaters etc    --> taper PF  to TID x1 week, then BIDx1 week, then daily x1 week, then stop  --> stop polytrim  --> ketorolac QID left eye until bottle runs out    Follow up in 3-4 weeks with MRx    3. 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 History of ptosis repair  - s/p brow ptosis repair with Dr. Porter 4/2021  - well healed; has been noticing increased ptosis right eye with temporal lashes touching and using his fingers to open right eye more  - address after #1-2      Follow up 1 month with MRx and no dilation or sooner PRN        -----------------------------------------------------------------------------------    Attestation:  Complete documentation of historical and exam elements from today's encounter can be found in the full encounter summary report (not reduplicated in this progress note). I personally obtained the chief complaint(s) and history of present illness.  I confirmed and edited as necessary the review of systems, past medical/surgical history, family history, social history, and examination findings as documented by others; and I examined the patient myself. I personally reviewed the relevant tests, images, and reports as documented above.     I formulated and edited as necessary the assessment and plan and discussed the findings and management plan with the patient and family.      Thania Condon MD

## 2021-11-12 ENCOUNTER — OFFICE VISIT (OUTPATIENT)
Dept: OPHTHALMOLOGY | Facility: CLINIC | Age: 61
End: 2021-11-12
Attending: OPHTHALMOLOGY
Payer: COMMERCIAL

## 2021-11-12 DIAGNOSIS — H47.392 CUP TO DISC ASYMMETRY, LEFT: ICD-10-CM

## 2021-11-12 DIAGNOSIS — Z96.1 PSEUDOPHAKIA OF BOTH EYES: Primary | ICD-10-CM

## 2021-11-12 DIAGNOSIS — Z98.890 HISTORY OF PTOSIS REPAIR: ICD-10-CM

## 2021-11-12 PROCEDURE — 99024 POSTOP FOLLOW-UP VISIT: CPT | Performed by: OPHTHALMOLOGY

## 2021-11-12 PROCEDURE — G0463 HOSPITAL OUTPT CLINIC VISIT: HCPCS

## 2021-11-12 ASSESSMENT — TONOMETRY
IOP_METHOD: TONOPEN
OD_IOP_MMHG: 13
OS_IOP_MMHG: 15

## 2021-11-12 ASSESSMENT — VISUAL ACUITY
METHOD: SNELLEN - LINEAR
OD_SC: 20/100
OD_PH_SC+: -1
OS_SC: 20/25
OS_SC+: -1
OD_PH_SC: 20/25

## 2021-11-12 ASSESSMENT — CUP TO DISC RATIO: OS_RATIO: 0.55

## 2021-11-12 ASSESSMENT — SLIT LAMP EXAM - LIDS
COMMENTS: VERY DEEP SULCUS
COMMENTS: VERY DEEP SULCUS

## 2021-11-12 NOTE — NURSING NOTE
"Chief Complaints and History of Present Illnesses   Patient presents with     Post Op (Ophthalmology) Both Eyes     S/p CE/IOL right eye 10/21/21    S/p CE/IOL left eye 11/4/21     Chief Complaint(s) and History of Present Illness(es)     Post Op (Ophthalmology) Both Eyes     Laterality: both eyes    Course: gradually improving    Associated symptoms: eye pain and tearing.  Negative for floaters and flashes    Treatments tried: eye drops    Pain scale: 0/10    Comments: S/p CE/IOL right eye 10/21/21    S/p CE/IOL left eye 11/4/21              Comments       Pt states \"eye are not working together\"  Using:  PF QID left eye  polytrim QID left eye   ketorolac QID left eye    Emmy Fonseca COT 9:37 AM November 12, 2021                   "

## 2021-11-18 ENCOUNTER — TELEPHONE (OUTPATIENT)
Dept: OPHTHALMOLOGY | Facility: CLINIC | Age: 61
End: 2021-11-18

## 2021-11-18 NOTE — TELEPHONE ENCOUNTER
[facility] Call [facility]    Phone Message    May a detailed message be left on voicemail: yes     Reason for Call: Other: Pt requesting a signed letter/ document stating that he was at his two last post op appts on 11/5/21 and [facility], pt requesting the letter be mailed to his home     Action Taken: Message routed to:  [facility] eye

## 2021-11-18 NOTE — TELEPHONE ENCOUNTER
Mailed out a appt verification letter to [facility]jun mccoy for [facility].     Meliza Munoz Communication Facilitator on 11/18/2021 at 10:16 AM

## 2021-11-26 ENCOUNTER — TELEPHONE (OUTPATIENT)
Dept: OPHTHALMOLOGY | Facility: CLINIC | Age: 61
End: 2021-11-26

## 2021-11-26 NOTE — TELEPHONE ENCOUNTER
[facility]    Phone Message    May a detailed message be left on voicemail: yes     Reason for Call: Symptoms or Concerns     If patient has red-flag symptoms, warm transfer to triage line    Current symptom or concern: eye lids are drooping and vision is unclear,     Symptoms have been present for:  1 month(s)    Has patient previously been seen for this? No      Are there any new or worsening symptoms? Yes: like looking through water, Patient states when he lifts his eye lids he can see better. Please call to discuss thank you.       Action Taken: Message routed to:  [facility]     Travel Screening: Not Applicable

## 2021-12-14 ENCOUNTER — OFFICE VISIT (OUTPATIENT)
Dept: OPHTHALMOLOGY | Facility: CLINIC | Age: 61
End: 2021-12-14
Attending: OPHTHALMOLOGY
Payer: COMMERCIAL

## 2021-12-14 DIAGNOSIS — Z96.1 PSEUDOPHAKIA OF BOTH EYES: Primary | ICD-10-CM

## 2021-12-14 DIAGNOSIS — Z98.890 HISTORY OF PTOSIS REPAIR: ICD-10-CM

## 2021-12-14 DIAGNOSIS — H43.813 POSTERIOR VITREOUS DETACHMENT OF BOTH EYES: ICD-10-CM

## 2021-12-14 DIAGNOSIS — H04.129 DRY EYE: ICD-10-CM

## 2021-12-14 DIAGNOSIS — H47.392 CUP TO DISC ASYMMETRY, LEFT: ICD-10-CM

## 2021-12-14 PROCEDURE — G0463 HOSPITAL OUTPT CLINIC VISIT: HCPCS

## 2021-12-14 PROCEDURE — 99024 POSTOP FOLLOW-UP VISIT: CPT | Performed by: OPHTHALMOLOGY

## 2021-12-14 ASSESSMENT — VISUAL ACUITY
OS_SC: 20/30
OS_PH_SC+: -2
OS_SC+: +2
OD_SC+: +1
OD_SC: 20/100
OD_PH_SC+: +2
OS_PH_SC: 20/25
OD_PH_SC: 20/30
METHOD: SNELLEN - LINEAR

## 2021-12-14 ASSESSMENT — CUP TO DISC RATIO
OS_RATIO: 0.55
OD_RATIO: 0.4

## 2021-12-14 ASSESSMENT — REFRACTION_MANIFEST
OD_CYLINDER: +2.75
OD_ADD: +2.50
OS_AXIS: 170
OD_AXIS: 025
OS_ADD: +2.50
OS_CYLINDER: +1.25
OS_SPHERE: -1.00
OD_SPHERE: -1.25

## 2021-12-14 ASSESSMENT — REFRACTION_WEARINGRX
OD_SPHERE: -1.00
OS_CYLINDER: +2.50
OD_AXIS: 024
OD_ADD: +2.50
OS_ADD: +2.50
OD_CYLINDER: +3.50
OS_SPHERE: -2.50
OS_AXIS: 165

## 2021-12-14 ASSESSMENT — TONOMETRY
OS_IOP_MMHG: 16
IOP_METHOD: TONOPEN
OD_IOP_MMHG: 15

## 2021-12-14 ASSESSMENT — SLIT LAMP EXAM - LIDS
COMMENTS: VERY DEEP SULCUS
COMMENTS: VERY DEEP SULCUS

## 2021-12-14 ASSESSMENT — CONF VISUAL FIELD
OS_NORMAL: 1
METHOD: COUNTING FINGERS
OD_NORMAL: 1

## 2021-12-14 NOTE — PROGRESS NOTES
HPI:  Arjun Amaya is a 61 year old male presenting for s/p CE/IOL right eye 10/21/21 and s/p CE/IOL left eye 11/4/21    Eyes are comfortable. Finished drops taper. Seeing well left eye and seeing well both eyes with glasses checked earlier.  While he was driving in this morning he saw peripheral light on his left side. Thinks it was lights from farms he was passing because he saw it just as passing farms. Also noted with anterior exam with oblique slit lamp light. No other flashes. No floaters.  Eyes have been feeling a little dry but he didn't want to use tear drops until cleared at this appointment.  Eyebrows are coming down again and he feels like his eyes are not open enough. Using fingers to open his lids further. Would like to address with oculoplastics.  Had another seizure about 3 days ago for the first time in 3 years. Had an aura and lay himself down but does not remember this. Was unconscious for at least 5 minutes. No injuries. Lost about 6 hours of memory. Seeing his neurologist this week.    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    FH:  No known family history of blindness, glaucoma, macular degeneration    ASSESSMENT and PLAN:  1. Pseudophakia, both eyes  - s/p CE/IOL right eye 10/21/21 and left eye 11/4/21  - significant astigmatism right eye but no toric lens per patient because of cost concerns  - completed drops and doing well  - MRx to 20/20 each eye -- dispensed    2. 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 History of ptosis repair  - s/p brow ptosis repair with Dr. Porter 4/2021  - well healed; has been  noticing increased ptosis right eye with temporal lashes touching and using his fingers to open right eye more  - refer back to Dr. Porter    4. Posterior vitreous detachment both eyes  - saw some flashes this morning left eye while passing farms while driving -- thinks it was lights in his peripheral vision and not flashes  - no other flashes, no floaters  - PVD each eye with retina attached each eye  - Reviewed signs/symptoms of retinal tear/detachment including shower of new floaters, flashes, curtain/veil, decreased vision, etc and patient understands to call/come in immediately for these     5. Dry eye  - restart ATs prn      Follow up with Dr. Porter next available or sooner PRN        -----------------------------------------------------------------------------------    Attestation:  Complete documentation of historical and exam elements from today's encounter can be found in the full encounter summary report (not reduplicated in this progress note). I personally obtained the chief complaint(s) and history of present illness.  I confirmed and edited as necessary the review of systems, past medical/surgical history, family history, social history, and examination findings as documented by others; and I examined the patient myself. I personally reviewed the relevant tests, images, and reports as documented above.     I formulated and edited as necessary the assessment and plan and discussed the findings and management plan with the patient and family.      Thania Condon MD

## 2021-12-14 NOTE — NURSING NOTE
Chief Complaints and History of Present Illnesses   Patient presents with     Pseudophakia Follow Up     1 month follow up      Chief Complaint(s) and History of Present Illness(es)     Pseudophakia Follow Up     Comments: 1 month follow up               Comments     Pt states vision comes and goes. Pt notes that he can see clearly sometimes and other times he needs to search to find a clear spot of vision.  Pt seeing clearly upon waking, blurs after a few hours. When pt holds his eyelids up, he sees much clearer.   New Flash of light peripherally in RE since this morning, pt not seeing it currently.  No redness. Dryness in both eyes that comes and goes.   Pt prediabetic, does not check blood sugars.  A1C: 6.5 taken about 3 weeks ago per pt.  No results found for: A1C    HOLA Bush December 14, 2021 10:53 AM

## 2022-01-03 ENCOUNTER — OFFICE VISIT (OUTPATIENT)
Dept: OPHTHALMOLOGY | Facility: CLINIC | Age: 62
End: 2022-01-03
Payer: COMMERCIAL

## 2022-01-03 DIAGNOSIS — H57.819 BROW PTOSIS: ICD-10-CM

## 2022-01-03 DIAGNOSIS — H02.422 MYOGENIC PTOSIS OF LEFT EYELID: Primary | ICD-10-CM

## 2022-01-03 PROCEDURE — 92081 LIMITED VISUAL FIELD XM: CPT | Mod: GC | Performed by: OPHTHALMOLOGY

## 2022-01-03 PROCEDURE — 99213 OFFICE O/P EST LOW 20 MIN: CPT | Mod: GC | Performed by: OPHTHALMOLOGY

## 2022-01-03 PROCEDURE — 92285 EXTERNAL OCULAR PHOTOGRAPHY: CPT | Mod: GC | Performed by: OPHTHALMOLOGY

## 2022-01-03 ASSESSMENT — MARGIN REFLEX DISTANCE
OS_MRD1: 1
OD_MRD1: 3

## 2022-01-03 ASSESSMENT — VISUAL ACUITY
OS_SC: 20/20
OD_SC: 20/25
OD_SC+: -1
OS_SC+: -1
METHOD: SNELLEN - LINEAR

## 2022-01-03 ASSESSMENT — CONF VISUAL FIELD
METHOD: COUNTING FINGERS
OS_NORMAL: 1
OD_NORMAL: 1

## 2022-01-03 ASSESSMENT — TONOMETRY
IOP_METHOD: ICARE
OD_IOP_MMHG: 09
OS_IOP_MMHG: 09

## 2022-01-03 ASSESSMENT — LAGOPHTHALMOS
OS_LAGOPHTHALMOS: 0
OD_LAGOPHTHALMOS: 0

## 2022-01-03 ASSESSMENT — LEVATOR FUNCTION
OD_LEVATOR: 15
OS_LEVATOR: 14

## 2022-01-03 ASSESSMENT — SLIT LAMP EXAM - LIDS: COMMENTS: VERY DEEP SULCUS

## 2022-01-03 NOTE — PATIENT INSTRUCTIONS
"EYEBROW AND FOREHEAD  Eyebrow and Forehead Lifting addresses eyebrow position and loose or wrinkled forehead skin and underlying tissue. To fully understand the benefits of eyebrow and forehead lifting, one must be aware of the importance of the position of the eyebrows.  Eyebrow position changes as we age.      Our natural eyebrow position, the effects of gravity and fat deflation, how active our eyebrow and forehead muscles are, and previous eyelid, eyebrow or forehead surgeries   (if applicable), all contribute to the position our eyebrows are in today. The face sends a message, and the position of the eyebrows is a significant part of that message.   That message could be, \"I feel worried,   I feel angry,  or  I feel stressed.  The message could also be,  I feel calm, rested, and relaxed.  There are several types of eyebrow and   forehead lifts. The type you and your surgeon choose will depend on your current eyebrow position, facial structure, and on what is possible to maximize your appearance. The main types of forehead lifting are as follows:     Endoscopic Forehead Lift      The Endoscopic Eyebrow/ Forehead Lift is very popular. It requires 5 small incisions hidden in the hair and leaves no visible scar. This procedure can lift everything from the hairline to the eyebrows. It tightens and smoothes the entire forehead while lifting the eyebrow area which  opens up  the entire upper face. Eyebrow shape and asymmetry can be addressed with this type of lift. Following the procedure there will be some bruising and swelling. Patients are usually comfortable returning to their normal routine activities in about 2 weeks.      The Pretrichial Eyebrow/ Forehead      The Pretrichial Eyebrow/ Forehead Lift requires a long incision. This procedure is ideal for patients who want to both lift the eyebrows and raise and shorten the forehead by removing a strip of skin and underlying tissue along the incision. Because the " forehead is shortened, the hairline is lowered which is ideal for those with a high forehead. This procedure lifts everything from the hairline down to the eyebrows and can address eyebrow shape and asymmetry. The scar from the incision, once healed, is virtually undetectable. Following the procedure there will be some bruising and swelling. Patients usually return to their normal daily routine in 2 - 3 weeks      The Direct Brow Lift      The Direct Brow Lift requires removing a section of skin and underlying tissue above and following the length of the eyebrows. This procedure is ideal for those who do not want to involve the hairline. This procedure also addresses eyebrow shape and asymmetry. Care is taken to position the scar just along the eyebrows so that it is maximally camouflaged as shown below. Patients will experience some bruising and swelling following the procedure. They are usually comfortable returning to their normal routine activities in about 2 weeks.     The Small-Incision and Transblepharoplasty Browpexy    The small incision browpexy involves a small incision (about   inch) placed in the brow hairs.  The transblepharoblasty technique uses the upper blepharoplasty incision. These techniques can be used to minimally lift and stabilize the tail of the eyebrow. These techniques are ideal for patients with minimal brow drooping and excess skin in the lateral eyelid.  Most patients are able to return to normal activities within one week.     Who Should Perform The Eyebrow and Forehead Lift?      When choosing a surgeon to perform an Eyebrow/ Forehead Lift, look for a cosmetic and reconstructive surgeon who specializes in the eyelids, orbit, and the adjacent structures such as the eyebrows, forehead, cheeks and midface. Dr. Porter s membership in the American Society of Ophthalmic Plastic and Reconstructive Surgery (ASOPRS) indicates he is not only a board certified ophthalmologist who knows the  anatomy and structure of the eyelids and orbit, but also has had extensive training in ophthalmic plastic reconstructive and cosmetic surgery.

## 2022-01-03 NOTE — PROGRESS NOTES
"Chief Complaints and History of Present Illnesses   Patient presents with     Droopy Both Upper Lids     Chief Complaint(s) and History of Present Illness(es)     Droopy Both Upper Lids     In right upper lid and left upper lid.  Since onset it is gradually   worsening.  Associated signs and symptoms include blurred vision and dry   eyes.  Negative for eye pain.  Treatments tried include eye drops.              Comments     S/p brow ptosis repair 04/2021, pt notes that his lashes are interfering   with his sight and focusing. Feels like he looking through a gls with   cloudy water, he states when manually opening lids he is able to bring   \"things into focus\", but still feels that his VA is cloudy and feels he   has to look around it to see more clearly, he feels there is a \"gunk/film\"   over.     Pt is using Visine all the time, only helps for a few minutes.     Ruma Swartz COT January 3, 2022 11:01 AM                     Assessment & Plan     Arjun Amaya is a 61 year old male with the following diagnoses:   1. Myogenic ptosis of left eyelid    2. Brow ptosis           S/p BUL ptosis repair (MMCR) 4/2021  - now with recurrent mild left upper lid ptosis  - pt states his lid position fluctuates throughout the day, worse in the evening. He reports diplopia that appears consistent with bilateral monocular diplopia, based on his description. No fatiguability on exam.          Temporal crescent  - pt reports persistent temporal crescent shadow since his cataract surgery. He was concerned this may be related to his lids becoming more droopy, but explained that this is likely related to the IOL  - continue monitoring    Bilateral monocular diplopia  Blurry vision  - pt with central PCOs each eye that may be contributing to his blurry vision and monocular diplopia -- consider YAG CAP  - advised switching from visine to PFATs QID each eye '    FUNCTIONAL COMPLAINTS RELATED TO DROOPY EYELIDS/BROWS:  Arjun MALCOLM Alexisjhonpia " describes upper lids interfering with superior visual field and interfering with activities of daily living including reading, driving and watching television.     EXAM:   Dominant eye left eye     MRD1: Right eye 3   Left eye 1    Brow ptosis with brow resting below superior orbital rim     VISUAL FIELD:  Right eye untaped:18 degrees Right eye taped:42 degrees  Left eye untaped:15 degrees Left eye taped:41 degrees    Right eye visual field improves by: 24 degrees  Left eye visual field improves by: 26 degrees      PLAN:    Bilateral direct brow ptosis repair - Brow ptosis repair is being performed to support and lift the brows which are resting below the orbital rim           Pricila Kelley MD  Oculoplastics Fellow      Attending Physician Attestation:  Complete documentation of historical and exam elements from today's encounter can be found in the full encounter summary report (not reduplicated in this progress note).  I personally obtained the chief complaint(s) and history of present illness.  I confirmed and edited as necessary the review of systems, past medical/surgical history, family history, social history, and examination findings as documented by others; and I examined the patient myself.  I personally reviewed the relevant tests, images, and reports as documented above.  I formulated and edited as necessary the assessment and plan and discussed the findings and management plan with the patient and family. I personally reviewed the ophthalmic test(s) associated with this encounter, and have completed the corresponding report(s) as necessary.   -Jayce Porter MD    Today with Arjun Amaya, I reviewed the indications, risks, benefits, and alternatives of the proposed surgical procedure including, but not limited to, failure obtain the desired result  and need for additional surgery, bleeding, infection, loss of vision, loss of the eye, and the remote possibility of permanent damage to any  organ system or death with the use of anesthesia.  I provided multiple opportunities for the questions, answered all questions to the best of my ability, and confirmed that my answers and my discussion were understood.     - Jayce Porter MD 12:00 PM 1/3/2022

## 2022-01-03 NOTE — NURSING NOTE
"Chief Complaints and History of Present Illnesses   Patient presents with     Droopy Both Upper Lids     Chief Complaint(s) and History of Present Illness(es)     Droopy Both Upper Lids     Laterality: right upper lid and left upper lid    Course: gradually worsening    Associated signs and symptoms: blurred vision and dry eyes.  Negative for eye pain    Treatments tried: eye drops              Comments     S/p brow ptosis repair 04/2021, pt notes that his lashes are interfering with his sight and focusing. Feels like he looking through a gls with cloudy water, he states when manually opening lids he is able to bring \"things into focus\", but still feels that his VA is cloudy and feels he has to look around it to see more clearly, he feels there is a \"gunk/film\" over.     Pt is using Visine all the time, only helps for a few minutes.     Ruma Swartz COT January 3, 2022 11:01 AM                  "

## 2022-01-03 NOTE — TELEPHONE ENCOUNTER
FUTURE VISIT INFORMATION      SURGERY INFORMATION:    Date: 2/2/22    [facility]    Surgeon:  Jayce Porter MD    Anesthesia Type:  MAC    Procedure: Bilateral direct brow lift    Consult: ov 1/3/22    RECORDS REQUESTED FROM:       Most recent EKG+ Tracing: 10/21/21

## 2022-01-07 ENCOUNTER — TELEPHONE (OUTPATIENT)
Dept: OPHTHALMOLOGY | Facility: CLINIC | Age: 62
End: 2022-01-07

## 2022-01-24 DIAGNOSIS — Z11.59 ENCOUNTER FOR SCREENING FOR OTHER VIRAL DISEASES: Primary | ICD-10-CM

## 2022-01-31 ENCOUNTER — TELEPHONE (OUTPATIENT)
Dept: OPHTHALMOLOGY | Facility: CLINIC | Age: 62
End: 2022-01-31

## 2022-01-31 ENCOUNTER — PRE VISIT (OUTPATIENT)
Dept: SURGERY | Facility: CLINIC | Age: 62
End: 2022-01-31

## 2022-01-31 NOTE — TELEPHONE ENCOUNTER
Patient called and requested to cancel and reschedule his eye procedure scheduled for 2/2 with Dr. Porter.    Patient has been rescheduled to 3/2 at the [facility].    Patient will have covid test placed on 2/28 at the  lab and H&P done with PAC.    Patients follow up appointment is scheduled for 3/14.    Surgery letter resent on 1/31.    Terese Mcnair  Perioperative   Ophthalmology/Oculoplastics  [phone]

## 2022-02-01 ENCOUNTER — TELEPHONE (OUTPATIENT)
Dept: OPHTHALMOLOGY | Facility: CLINIC | Age: 62
End: 2022-02-01

## 2022-02-01 NOTE — TELEPHONE ENCOUNTER
Patients called and [facility]    Patients pre op forms have been faxed as requested to [phone].    Terese Mcnair  Perioperative   Ophthalmology/Oculoplastics  [phone]

## 2022-02-01 NOTE — TELEPHONE ENCOUNTER
FUTURE VISIT INFORMATION        SURGERY INFORMATION:    Date: 3/2/22    [facility]    Surgeon:  Jayce Porter MD    Anesthesia Type:  MAC    Procedure: Bilateral direct brow lift    Consult: ov 1/3/22     RECORDS REQUESTED FROM:         Most recent EKG+ Tracing: 10/21/21     not applicable

## 2022-02-28 ENCOUNTER — PRE VISIT (OUTPATIENT)
Dept: SURGERY | Facility: CLINIC | Age: 62
End: 2022-02-28

## 2022-03-01 ENCOUNTER — TELEPHONE (OUTPATIENT)
Dept: OPHTHALMOLOGY | Facility: CLINIC | Age: 62
End: 2022-03-01

## 2022-03-01 ENCOUNTER — ANESTHESIA EVENT (OUTPATIENT)
Dept: SURGERY | Facility: AMBULATORY SURGERY CENTER | Age: 62
End: 2022-03-01
Payer: COMMERCIAL

## 2022-03-01 ENCOUNTER — LAB (OUTPATIENT)
Dept: LAB | Facility: CLINIC | Age: 62
End: 2022-03-01
Attending: OPHTHALMOLOGY
Payer: COMMERCIAL

## 2022-03-01 DIAGNOSIS — Z11.59 ENCOUNTER FOR SCREENING FOR OTHER VIRAL DISEASES: ICD-10-CM

## 2022-03-01 LAB — SARS-COV-2 RNA RESP QL NAA+PROBE: NEGATIVE

## 2022-03-01 PROCEDURE — U0003 INFECTIOUS AGENT DETECTION BY NUCLEIC ACID (DNA OR RNA); SEVERE ACUTE RESPIRATORY SYNDROME CORONAVIRUS 2 (SARS-COV-2) (CORONAVIRUS DISEASE [COVID-19]), AMPLIFIED PROBE TECHNIQUE, MAKING USE OF HIGH THROUGHPUT TECHNOLOGIES AS DESCRIBED BY CMS-2020-01-R: HCPCS | Mod: 90 | Performed by: PATHOLOGY

## 2022-03-01 PROCEDURE — U0005 INFEC AGEN DETEC AMPLI PROBE: HCPCS | Mod: 90 | Performed by: PATHOLOGY

## 2022-03-01 PROCEDURE — 99000 SPECIMEN HANDLING OFFICE-LAB: CPT | Performed by: PATHOLOGY

## 2022-03-01 NOTE — ANESTHESIA PREPROCEDURE EVALUATION
Anesthesia Pre-Procedure Evaluation    Patient: Arjun Amaya   MRN: [id number] : 1960        Procedure : Procedure(s):  Bilateral direct brow lift          Past Medical History:   Diagnosis Date     Hypertension       Past Surgical History:   Procedure Laterality Date     CATARACT IOL, RT/LT       PHACOEMULSIFICATION WITH STANDARD INTRAOCULAR LENS IMPLANT Right 10/21/2021    Procedure: RIGHT EYE PHACOEMULSIFICATION, CATARACT, WITH STANDARD INTRAOCULAR LENS IMPLANT INSERTION;  Surgeon: Thania Condon MD;  Location: [facility] OR     PHACOEMULSIFICATION WITH STANDARD INTRAOCULAR LENS IMPLANT Left 2021    Procedure: PHACOEMULSIFICATION, CATARACT, WITH STANDARD INTRAOCULAR LENS IMPLANT INSERTION;  Surgeon: Thania Condon MD;  Location: [facility] OR     REPAIR PTOSIS BILATERAL Bilateral 2021    Procedure: Bilateral upper lid ptosis repair;  Surgeon: Jayce Porter MD;  Location: [facility] OR      Allergies   Allergen Reactions     Ciprofloxacin Rash     Rash    Rash     Adderall      Other reaction(s): Psychotic disorder     Amphetamine-Dextroamphetamine Other (See Comments)     Other reaction(s): Psychotic disorder     Contrast Dye GI Disturbance     Other reaction(s): Unknown Reaction     Dextroamphetamine Other (See Comments)     Other reaction(s): Hallucination  Other reaction(s): Hallucinations  Other reaction(s): Hallucination       Valproic Acid Nausea and Vomiting      Social History     Tobacco Use     Smoking status: Never Smoker     Smokeless tobacco: Never Used   Substance Use Topics     Alcohol use: Never      Wt Readings from Last 1 Encounters:   21 127 kg (280 lb)        Anesthesia Evaluation            ROS/MED HX  ENT/Pulmonary:       Neurologic: Comment: TBI, seizures      Cardiovascular:     (+) hypertension-----    METS/Exercise Tolerance:     Hematologic:       Musculoskeletal:       GI/Hepatic:     (+) GERD,     Renal/Genitourinary:       Endo:     (+) thyroid problem,  hypothyroidism, Obesity,     Psychiatric/Substance Use:       Infectious Disease:       Malignancy:       Other:            Physical Exam    Airway        Mallampati: II   TM distance: > 3 FB      Respiratory Devices and Support         Dental           Cardiovascular          Rhythm and rate: regular     Pulmonary           breath sounds clear to auscultation           OUTSIDE LABS:  CBC: No results found for: WBC, HGB, HCT, PLT  BMP: No results found for: NA, POTASSIUM, CHLORIDE, CO2, BUN, CR, GLC  COAGS: No results found for: PTT, INR, FIBR  POC: No results found for: BGM, HCG, HCGS  HEPATIC: No results found for: ALBUMIN, PROTTOTAL, ALT, AST, GGT, ALKPHOS, BILITOTAL, BILIDIRECT, CLEMENTINA  OTHER: No results found for: PH, LACT, A1C, RAMONA, PHOS, MAG, LIPASE, AMYLASE, TSH, T4, T3, CRP, SED    Anesthesia Plan    ASA Status:  3   NPO Status:  NPO Appropriate    Anesthesia Type: MAC.              Consents    Anesthesia Plan(s) and associated risks, benefits, and realistic alternatives discussed. Questions answered and patient/representative(s) expressed understanding.    - Discussed:     - Discussed with:  Patient         Postoperative Care            Comments:                Kenny Maharaj MD

## 2022-03-01 NOTE — TELEPHONE ENCOUNTER
Per pre op nurse patient [facility] not [facility].    Patient has been scheduled for a covid test at the [facility] on 3/1 for his procedure on 3/2 with Dr. Porter.    Terese Mcnair  Perioperative   Ophthalmology/Oculoplastics  [phone]

## 2022-03-02 ENCOUNTER — ANESTHESIA (OUTPATIENT)
Dept: SURGERY | Facility: AMBULATORY SURGERY CENTER | Age: 62
End: 2022-03-02
Payer: COMMERCIAL

## 2022-03-02 ENCOUNTER — HOSPITAL ENCOUNTER (OUTPATIENT)
Facility: AMBULATORY SURGERY CENTER | Age: 62
End: 2022-03-02
Attending: OPHTHALMOLOGY
Payer: COMMERCIAL

## 2022-03-02 VITALS
WEIGHT: 285 LBS | RESPIRATION RATE: 16 BRPM | SYSTOLIC BLOOD PRESSURE: 122 MMHG | OXYGEN SATURATION: 93 % | TEMPERATURE: 97.3 F | HEIGHT: 73 IN | DIASTOLIC BLOOD PRESSURE: 88 MMHG | BODY MASS INDEX: 37.77 KG/M2 | HEART RATE: 98 BPM

## 2022-03-02 DIAGNOSIS — H02.422 MYOGENIC PTOSIS OF LEFT EYELID: ICD-10-CM

## 2022-03-02 DIAGNOSIS — H57.813 BROW PTOSIS, BILATERAL: Primary | ICD-10-CM

## 2022-03-02 PROCEDURE — 67900 REPAIR BROW DEFECT: CPT | Mod: RT

## 2022-03-02 PROCEDURE — 67900 REPAIR BROW DEFECT: CPT | Mod: 50 | Performed by: OPHTHALMOLOGY

## 2022-03-02 RX ORDER — OXYCODONE HYDROCHLORIDE 5 MG/1
5 TABLET ORAL EVERY 6 HOURS PRN
Qty: 12 TABLET | Refills: 0 | Status: SHIPPED | OUTPATIENT
Start: 2022-03-02 | End: 2022-03-05

## 2022-03-02 RX ORDER — AMOXICILLIN 500 MG/1
500 TABLET, FILM COATED ORAL 2 TIMES DAILY
COMMUNITY
End: 2022-08-17

## 2022-03-02 RX ORDER — ONDANSETRON 2 MG/ML
INJECTION INTRAMUSCULAR; INTRAVENOUS PRN
Status: DISCONTINUED | OUTPATIENT
Start: 2022-03-02 | End: 2022-03-02

## 2022-03-02 RX ORDER — ERYTHROMYCIN 5 MG/G
OINTMENT OPHTHALMIC
Qty: 3.5 G | Refills: 0 | Status: SHIPPED | OUTPATIENT
Start: 2022-03-02 | End: 2022-08-17

## 2022-03-02 RX ORDER — FENTANYL CITRATE 50 UG/ML
25 INJECTION, SOLUTION INTRAMUSCULAR; INTRAVENOUS
Status: DISCONTINUED | OUTPATIENT
Start: 2022-03-02 | End: 2022-03-03 | Stop reason: HOSPADM

## 2022-03-02 RX ORDER — SODIUM CHLORIDE, SODIUM LACTATE, POTASSIUM CHLORIDE, CALCIUM CHLORIDE 600; 310; 30; 20 MG/100ML; MG/100ML; MG/100ML; MG/100ML
INJECTION, SOLUTION INTRAVENOUS CONTINUOUS
Status: DISCONTINUED | OUTPATIENT
Start: 2022-03-02 | End: 2022-03-03 | Stop reason: HOSPADM

## 2022-03-02 RX ORDER — MEPERIDINE HYDROCHLORIDE 25 MG/ML
12.5 INJECTION INTRAMUSCULAR; INTRAVENOUS; SUBCUTANEOUS
Status: DISCONTINUED | OUTPATIENT
Start: 2022-03-02 | End: 2022-03-03 | Stop reason: HOSPADM

## 2022-03-02 RX ORDER — TETRACAINE HYDROCHLORIDE 5 MG/ML
SOLUTION OPHTHALMIC PRN
Status: DISCONTINUED | OUTPATIENT
Start: 2022-03-02 | End: 2022-03-02 | Stop reason: HOSPADM

## 2022-03-02 RX ORDER — HYDROMORPHONE HYDROCHLORIDE 1 MG/ML
0.2 INJECTION, SOLUTION INTRAMUSCULAR; INTRAVENOUS; SUBCUTANEOUS EVERY 5 MIN PRN
Status: DISCONTINUED | OUTPATIENT
Start: 2022-03-02 | End: 2022-03-03 | Stop reason: HOSPADM

## 2022-03-02 RX ORDER — ERYTHROMYCIN 5 MG/G
OINTMENT OPHTHALMIC PRN
Status: DISCONTINUED | OUTPATIENT
Start: 2022-03-02 | End: 2022-03-02 | Stop reason: HOSPADM

## 2022-03-02 RX ORDER — PROPOFOL 10 MG/ML
INJECTION, EMULSION INTRAVENOUS PRN
Status: DISCONTINUED | OUTPATIENT
Start: 2022-03-02 | End: 2022-03-02

## 2022-03-02 RX ORDER — LIDOCAINE 40 MG/G
CREAM TOPICAL
Status: DISCONTINUED | OUTPATIENT
Start: 2022-03-02 | End: 2022-03-03 | Stop reason: HOSPADM

## 2022-03-02 RX ORDER — FENTANYL CITRATE 50 UG/ML
25 INJECTION, SOLUTION INTRAMUSCULAR; INTRAVENOUS EVERY 5 MIN PRN
Status: DISCONTINUED | OUTPATIENT
Start: 2022-03-02 | End: 2022-03-03 | Stop reason: HOSPADM

## 2022-03-02 RX ORDER — ONDANSETRON 4 MG/1
4 TABLET, ORALLY DISINTEGRATING ORAL EVERY 30 MIN PRN
Status: DISCONTINUED | OUTPATIENT
Start: 2022-03-02 | End: 2022-03-03 | Stop reason: HOSPADM

## 2022-03-02 RX ORDER — FENTANYL CITRATE 50 UG/ML
INJECTION, SOLUTION INTRAMUSCULAR; INTRAVENOUS PRN
Status: DISCONTINUED | OUTPATIENT
Start: 2022-03-02 | End: 2022-03-02

## 2022-03-02 RX ORDER — ONDANSETRON 2 MG/ML
4 INJECTION INTRAMUSCULAR; INTRAVENOUS EVERY 30 MIN PRN
Status: DISCONTINUED | OUTPATIENT
Start: 2022-03-02 | End: 2022-03-03 | Stop reason: HOSPADM

## 2022-03-02 RX ORDER — LIDOCAINE HYDROCHLORIDE AND EPINEPHRINE 10; 10 MG/ML; UG/ML
INJECTION, SOLUTION INFILTRATION; PERINEURAL PRN
Status: DISCONTINUED | OUTPATIENT
Start: 2022-03-02 | End: 2022-03-02 | Stop reason: HOSPADM

## 2022-03-02 RX ORDER — ACETAMINOPHEN 325 MG/1
975 TABLET ORAL ONCE
Status: DISCONTINUED | OUTPATIENT
Start: 2022-03-02 | End: 2022-03-03 | Stop reason: HOSPADM

## 2022-03-02 RX ORDER — OXYCODONE HYDROCHLORIDE 5 MG/1
5 TABLET ORAL EVERY 4 HOURS PRN
Status: DISCONTINUED | OUTPATIENT
Start: 2022-03-02 | End: 2022-03-03 | Stop reason: HOSPADM

## 2022-03-02 RX ADMIN — SODIUM CHLORIDE, SODIUM LACTATE, POTASSIUM CHLORIDE, CALCIUM CHLORIDE: 600; 310; 30; 20 INJECTION, SOLUTION INTRAVENOUS at 06:35

## 2022-03-02 RX ADMIN — PROPOFOL 50 MG: 10 INJECTION, EMULSION INTRAVENOUS at 07:26

## 2022-03-02 RX ADMIN — ONDANSETRON 4 MG: 2 INJECTION INTRAMUSCULAR; INTRAVENOUS at 07:10

## 2022-03-02 RX ADMIN — PROPOFOL 50 MG: 10 INJECTION, EMULSION INTRAVENOUS at 07:25

## 2022-03-02 RX ADMIN — FENTANYL CITRATE 50 MCG: 50 INJECTION, SOLUTION INTRAMUSCULAR; INTRAVENOUS at 07:10

## 2022-03-02 RX ADMIN — FENTANYL CITRATE 50 MCG: 50 INJECTION, SOLUTION INTRAMUSCULAR; INTRAVENOUS at 07:15

## 2022-03-02 NOTE — OP NOTE
PREOPERATIVE DIAGNOSIS: Bilateral brow ptosis.     POSTOPERATIVE DIAGNOSIS: Bilateral brow ptosis.     PROCEDURE PERFORMED: Bilateral direct brow lift.     SURGEON: Lenora Patel MD     ASSISTANT: Pricila Kelley MD    ANESTHESIA: Monitored with local infiltration of 1% lidocaine with epinephrine [id number].    COMPLICATIONS: None.     ESTIMATED BLOOD LOSS: Less than 5 cc.     INDICATIONS: Arjun Amaya  presented with bilateral brow ptosis with the eyebrows resting below the orbital rim and obstructing the superior and lateral visual fields. After the risks, benefits and alternatives to the proposed procedure were explained to the patient Informed Consent was obtained.     DESCRIPTION OF PROCEDURE: Arjun Amaya  was marked in the preoperative area. The superior brow hairs were marked, and the brow was lifted to its expected height, allowed to descend, and this point was marked on the eyebrow. An ellipse was drawn encompassing these marks on each side. Arjun Amaya  was brought to the operating room and placed supine on the operating table. IV sedation was given. The brow areas were infiltrated with a local anesthetic mixture, and the patient was prepped and draped in the typical sterile ophthalmic fashion. Attention was directed to the right side. The skin was incised following the marked lines. Skin and subcutaneous tissue were excised with a Rhina scissors. Hemostasis was obtained with monopolar cautery. The deep tissues were closed with interrupted buried 5-0 Vicryl sutures securing the dermis. The skin was closed with a running locked 6-0 Prolene suture. Erythromycin ointment was applied. Attention was directed to the left side where the same procedure was performed. The patient tolerated the procedures well. Arjun Amaya  left the operating room in stable condition.     LENORA PATEL MD

## 2022-03-02 NOTE — DISCHARGE INSTRUCTIONS
Post-operative Instructions    Ophthalmic Plastic and Reconstructive Surgery  Jayce Porter M.D.  Pricila Kelley M.D.    All instructions apply to the operated eye(s) or eyelid(s)    What to expect after surgery:    There will be some swelling, bruising, and likely a black eye (even into the lower eyelids and cheeks). Also expect crusting and discharge from the eye and/or incisions.     A small amount of surface bleeding is normal for the first 48 hours after surgery.    You may notice some bloody tears for the first few days after surgery. This is normal.    Your eye(s) and eyelid(s) may be painful and tender. This is normal after surgery. Use the pain medication as prescribed. If your pain does not improve despite the medication, contact the office.    Wound care and personal care:    If a patch or bandage has been placed, please leave this in place until seen in clinic. Prevent the bandage from getting wet.     Apply ice compresses 15 minutes on 15 minutes off while awake for the first 2 days after surgery, then switch to warm compresses 4 times a day until seen by your physician.     For warm packs you can place a cup of dry uncooked rice in a clean cotton sock. Place sock in microwave 30 seconds to one minute. Next place the warm sock into a plastic bag and wrap the bag with clean warm wet washcloth and place over operated eye.      You may shower or wash your hair the day after surgery. Do not bathe or go swimming for 1 week to prevent contamination of your wounds.    Do not apply make-up to the eyes or eyelids for 2 weeks after surgery.    Activity restrictions and driving:    Avoid heavy lifting, bending, exercise or strenuous activity for 1 week after surgery.    You may resume other activities and return to work as tolerated.    You may not resume driving until have you stopped using narcotic pain medications(such as Norco, Percocet, Tylenol #3).    Medications:    Restart all your regular home  medications and eye drops today. If you take Plavix or Aspirin on a regular basis, wait for 3 days after your surgery before restarting these in order to decrease the risk of bleeding complications.    Avoid aspirin and aspirin-like medications (Motrin, Aleve, Ibuprofen, Juanita-[place]uz[place] etc) for 5 days to reduce the risk of bleeding. You may take Tylenol (acetaminophen) for pain.    In addition to your home medications, take the following post-operative medications as prescribed by your physician:    Apply antibiotic ointment (erythromycin) to all sutures three times a day, and into the operated eye(s) at night.     Take scheduled extra strength Tylenol for pain.  You may take 1 to 2 pain pills (norco or oxycodone as prescribed) as needed for breakthrough pain up to every 6 hours.    The pain pills may make you drowsy. You must not drive a car, operate heavy machinery or drink alcohol while taking them.    The pain pills may cause constipation and nausea. Take them with some food to prevent a stomach upset. If you continue to experience nausea, call your physician.      WARNING: All the prescription pain medications listed above contain Tylenol (acetaminophen). You must not take more than 4,000 mg of acetaminophen per 24-hour period. This is equivalent to 6 tablets of Darvocet, 8 tablets of Vicodin, or 12 tablets of Norco, Percocet or Tylenol #3. If you take other over-the-counter medications containing acetaminophen, you must take the amount of acetaminophen into account and reduce the number of prescribed pain pills accordingly.    Contact information and follow-up:    Return to the Eye Clinic for a follow-up appointment with your physician as  scheduled. If no appointment has been scheduled, call [phone] for an  appointment with Dr. Porter within 1 to 2 weeks from your date of surgery.  -     Please email a few photos of your eye(s) or other operative site(s) to [email or web address] prior to your follow up  visit.      For severe pain, bleeding, or loss of vision, call the Eye Clinic at [phone].    After hours or on weekends and holidays, call [phone] and ask to speak with the ophthalmologist on call.  [facility]  Home Care Following Anesthesia  For 24 hours after surgery:  1. Get plenty of rest.  A responsible adult must stay with you for at least 24 hours after you leave the surgery center.  2. Do not drive or use heavy equipment.  If you have weakness or tingling, don't drive or use heavy equipment until this feeling goes away.   3. Do not drink alcohol.   4. Avoid strenuous or risky activities.  Ask for help when climbing stairs.  5. You may feel lightheaded.  IF so, sit for a few minutes before standing.  Have someone help you get up.   6. If you have nausea (feel sick to your stomach): Drink only clear liquids such as apple juice, ginger ale, broth or 7-Up.  Rest may also help.  Be sure to drink enough fluids.  Move to a regular diet as you feel able.   7. You may have a slight fever.  Call the doctor if your fever is over 100 F (37.7 C) (taken under the tongue) or lasts longer than 24 hours.  8. You may have a dry mouth, a sore throat, muscle aches or trouble sleeping. These should go away after 24 hours.  9. Do not make important or legal decisions.   10. It is recommended to avoid smoking.               Tips for taking pain medications  To get the best pain relief possible, remember these points:    Take pain medications as directed, before pain becomes severe.    Pain medication can upset your stomach: taking it with food may help.    Constipation is a common side effect of pain medication. Drink plenty of  fluids.    Eat foods high in fiber. Take a stool softener if recommended by your doctor or pharmacist.    Do not drink alcohol, drive or operate machinery while taking pain medications.    Ask about other ways to control pain, such as with heat, ice or  relaxation.    Tylenol/Acetaminophen Consumption  To help encourage the safe use of acetaminophen, the makers of TYLENOL  have lowered the maximum daily dose for single-ingredient Extra Strength TYLENOL  (acetaminophen) products sold in the [place] from 8 pills per day (4,000 mg) to 6 pills per day (3,000 mg). The dosing interval has also changed from 2 pills every 4-6 hours to 2 pills every 6 hours.    If you feel your pain relief is insufficient, you may take Tylenol/Acetaminophen in addition to your narcotic pain medication.     Be careful not to exceed 3,000 mg of Tylenol/Acetaminophen in a 24 hour period from all sources.    If you are taking extra strength Tylenol/acetaminophen (500 mg), the maximum dose is 6 tablets in 24 hours.    If you are taking regular strength acetaminophen (325 mg), the maximum dose is 9 tablets in 24 hours.    Call a doctor for any of the followin. Signs of infection (fever, growing tenderness at the surgery site, a large amount of drainage or bleeding, severe pain, foul-smelling drainage, redness, swelling).  2. It has been over 8 to 10 hours since surgery and you are still not able to urinate (pass water).  3. Headache for over 24 hours.  4. Signs of Covid-19 infection (temperature over 100 degrees, shortness of breath, cough, loss of taste/smell, generalized body aches, persistent headache, chills, sore throat, nausea/vomiting/diarrhea)  Your doctor is:  Dr. Jayce Porter, Ophthalmology: [phone]                  Or dial [phone] and ask for the resident on call for:  Ophthalmology  For emergency care, call the:  [facility]:  [phone] (TTY for hearing impaired: [phone])

## 2022-03-02 NOTE — BRIEF OP NOTE
[facility]    Brief Operative Note    Pre-operative diagnosis: Myogenic ptosis of left eyelid [H02.422]  Post-operative diagnosis Same as pre-operative diagnosis    Procedure: Procedure(s):  Bilateral direct brow lift  Surgeon: Surgeon(s) and Role:     * Jayce Porter MD - Primary   Pricila Kelley MD - assistant  Yeny Ng MD - assistant  Anesthesia: Monitor Anesthesia Care   Estimated Blood Loss: Minimal    Drains: None  Specimens: * No specimens in log *  Findings:   as expected.  Complications: None.  Implants: * No implants in log *

## 2022-03-02 NOTE — ANESTHESIA CARE TRANSFER NOTE
Patient: Arjun Amaya    Procedure: Procedure(s):  Bilateral direct brow lift       Diagnosis: Myogenic ptosis of left eyelid [H02.422]  Diagnosis Additional Information: No value filed.    Anesthesia Type:   MAC     Note:                      Comments: Completed by Wendy Koehler CRNA        Vitals:  Vitals Value Taken Time   /88 03/02/22 0840   Temp 36.3  C (97.3  F) 03/02/22 0840   Pulse     Resp 16 03/02/22 0840   SpO2 93 % 03/02/22 0840       Electronically Signed By: Kenny Maharaj MD  March 2, 2022  10:20 AM

## 2022-03-02 NOTE — ANESTHESIA POSTPROCEDURE EVALUATION
Patient: Arjun Amaya    Procedure: Procedure(s):  Bilateral direct brow lift       Anesthesia Type:  MAC    Note:  Disposition: Outpatient   Postop Pain Control: Uneventful            Sign Out: Well controlled pain   PONV: No   Neuro/Psych: Uneventful            Sign Out: Acceptable/Baseline neuro status   Airway/Respiratory: Uneventful            Sign Out: Acceptable/Baseline resp. status   CV/Hemodynamics: Uneventful            Sign Out: Acceptable CV status; No obvious hypovolemia; No obvious fluid overload   Other NRE: NONE   DID A NON-ROUTINE EVENT OCCUR? No           Last vitals:  Vitals Value Taken Time   /88 03/02/22 0840   Temp 36.3  C (97.3  F) 03/02/22 0840   Pulse     Resp 16 03/02/22 0840   SpO2 93 % 03/02/22 0840       Electronically Signed By: Kenny Maharaj MD  March 2, 2022  10:20 AM

## 2022-03-03 NOTE — PROGRESS NOTES
POD1  A telephone call was made to Arjun Amaya to make sure the post operative course has been uneventful and that all questions were answered. There was no answer and a telephone message was left.    Pricila Kelley MD

## 2022-03-16 ENCOUNTER — TELEPHONE (OUTPATIENT)
Dept: OPHTHALMOLOGY | Facility: CLINIC | Age: 62
End: 2022-03-16

## 2022-03-18 ENCOUNTER — VIRTUAL VISIT (OUTPATIENT)
Dept: OPHTHALMOLOGY | Facility: CLINIC | Age: 62
End: 2022-03-18
Payer: COMMERCIAL

## 2022-03-18 DIAGNOSIS — Z98.890 POSTOPERATIVE EYE STATE: Primary | ICD-10-CM

## 2022-03-18 PROCEDURE — 99024 POSTOP FOLLOW-UP VISIT: CPT | Performed by: OPHTHALMOLOGY

## 2022-03-18 NOTE — PROGRESS NOTES
Arjun is a 62 year old who is being evaluated via a billable telephone visit.      What phone number would you like to be contacted at? mobile  How would you like to obtain your AVS? Mail a copy        Subjective   Arjun is a 62 year old who presents for the following health issues     HPI   S/p Bilateral Direct Lift  Looks great! Super happy!!    Review of Systems   Constitutional, HEENT, cardiovascular, pulmonary, gi and gu systems are negative, except as otherwise noted.      Objective           Vitals:  No vitals were obtained today due to virtual visit.    Physical Exam   healthy, alert and no distress  PSYCH: Alert and oriented times 3; coherent speech, normal   rate and volume, able to articulate logical thoughts, able   to abstract reason, no tangential thoughts, no hallucinations   or delusions  His affect is normal  RESP: No cough, no audible wheezing, able to talk in full sentences  Remainder of exam unable to be completed due to telephone visits         Assessment & Plan     Arjun Amaya is a 62 year old male with the following diagnoses:   1. Postoperative eye state           PLAN:  Continue antibiotic ointment or bland lubricating ointment (eg vaseline or aquaphor) to the incision(s) two times a day.    Gently massage along the incision(s) two times a day.    Use warm soaks over the incision(s) four times a day until swelling and bruises resolve.    Return to clinic Monday 9:45                Phone call duration: 5 minutes  Attending Physician Attestation:  I personally called this patient. Complete documentation of historical and exam elements from today's encounter can be found in the full encounter summary report (not reduplicated in this progress note).  I personally obtained the chief complaint(s) and history of present illness.  I confirmed and edited as necessary the review of systems, past medical/surgical history, family history, and social history.  I formulated and edited as necessary  the assessment and plan and discussed the findings and management plan with the patient and family.     -Jayce Porter MD;

## 2022-03-21 ENCOUNTER — OFFICE VISIT (OUTPATIENT)
Dept: OPHTHALMOLOGY | Facility: CLINIC | Age: 62
End: 2022-03-21
Attending: OPHTHALMOLOGY
Payer: COMMERCIAL

## 2022-03-21 DIAGNOSIS — Z98.890 POSTOPERATIVE EYE STATE: Primary | ICD-10-CM

## 2022-03-21 PROCEDURE — 99024 POSTOP FOLLOW-UP VISIT: CPT | Mod: GC | Performed by: OPHTHALMOLOGY

## 2022-03-21 ASSESSMENT — REFRACTION_WEARINGRX
OD_ADD: +2.50
OS_ADD: +2.50
OS_CYLINDER: +2.50
SPECS_TYPE: PAL
OS_SPHERE: -2.50
OS_AXIS: 165
OD_SPHERE: -1.00
OD_AXIS: 024
OD_CYLINDER: +3.50

## 2022-03-21 ASSESSMENT — VISUAL ACUITY
OS_CC+: -1
OS_CC: 20/20
OD_CC: 20/20
METHOD: SNELLEN - LINEAR

## 2022-03-21 ASSESSMENT — TONOMETRY
IOP_METHOD: ICARE
OD_IOP_MMHG: 11
OS_IOP_MMHG: 11

## 2022-03-21 ASSESSMENT — SLIT LAMP EXAM - LIDS: COMMENTS: VERY DEEP SULCUS

## 2022-03-21 NOTE — LETTER
March 21, 2022      Re: Arjun Amaya   1960    To Whom It May Concern:    This is to confirm that the above patient was seen on 3/21/2022.      Thank you for your cooperation in this matter.  Please do not hesitate to contact me if you have any further questions.    Sincerely,      LENORA PATEL        
no

## 2022-03-21 NOTE — NURSING NOTE
Chief Complaints and History of Present Illnesses   Patient presents with     Post Op (Ophthalmology) Both Eyes     Chief Complaint(s) and History of Present Illness(es)     Post Op (Ophthalmology) Both Eyes     Laterality: both eyes    Pain scale: 0/10              Comments     Procedure(s):  Bilateral direct brow lift - DOS 3/2/2022    Pt here for post procedure care visit.   Pt states healing going well.   No longer looking through cloudy water.  Would like more eye drops for saturating eyes.    Ocular meds =   erythromycin (ROMYCIN) 5 MG/GM ophthalmic ointment    Aguila Choi COA, FINN 9:39 AM 03/21/2022

## 2022-06-17 ENCOUNTER — TELEPHONE (OUTPATIENT)
Dept: OPHTHALMOLOGY | Facility: CLINIC | Age: 62
End: 2022-06-17

## 2022-06-17 NOTE — TELEPHONE ENCOUNTER
S/p Bilateral direct brow lift - DOS 3/2/2022    Spoke to pt at 1650    Pt with complaint of intermittent blurring of vision and tearing/pooling in corners of eye.    Has been going on for couple months    Recommended coming in for eye exam    No updated insurance in system    Pt states has [facility] coverage and will be calling Monday to secure referral/approval    Reviewed would ask [facility]  reach out mid week next week to confirm approval with patient, update [facility] information and assist in scheduling if approved    Pt seemed comfortable with plan    Adebayo Villalta RN 4:57 PM 06/17/22                Monique Arjun E [phone]  home/cell    No answer and unable to leave voicemail.    Adebayo Villalta RN 1:48 PM 06/17/22    ---             [facility] Call Center    Phone Message    May a detailed message be left on voicemail: yes     Reason for Call: Symptoms or Concerns     If patient has red-flag symptoms, warm transfer to triage line    Current symptom or concern: Pt reports that he is having difficulty seeing post surgery in March. He states that his eye does not seem to be flushing and he is using a lot of drops to moisturize the eyes. When he uses this, he can see fine but otherwise has cloudy vision.    Symptoms have been present for:  Few month(s)    Has patient previously been seen for this? Yes    By : Dr. Porter    Date: 3/21/22    Are there any new or worsening symptoms? No      Action Taken: Message routed to:  [facility]    Travel Screening: Not Applicable

## 2022-06-20 NOTE — TELEPHONE ENCOUNTER
Spoke with patient regarding scheduling for: same issues of intermittent blurring of vision and tearing/pooling in corners of eye. Scheduled patient accordingly and patient is aware of date, time and location.-Per Patient

## 2022-06-27 ENCOUNTER — OFFICE VISIT (OUTPATIENT)
Dept: OPHTHALMOLOGY | Facility: CLINIC | Age: 62
End: 2022-06-27
Payer: COMMERCIAL

## 2022-06-27 DIAGNOSIS — H02.109 ECTROPION DUE TO LAXITY OF EYELID, UNSPECIFIED LATERALITY: ICD-10-CM

## 2022-06-27 DIAGNOSIS — Z98.890 POSTOPERATIVE EYE STATE: Primary | ICD-10-CM

## 2022-06-27 DIAGNOSIS — H18.523 ANTERIOR BASEMENT MEMBRANE DYSTROPHY OF BOTH EYES: ICD-10-CM

## 2022-06-27 DIAGNOSIS — H04.123 DRY EYE SYNDROME OF BOTH EYES: ICD-10-CM

## 2022-06-27 DIAGNOSIS — H02.59 FLOPPY EYELID SYNDROME OF BOTH EYES: ICD-10-CM

## 2022-06-27 PROCEDURE — 99213 OFFICE O/P EST LOW 20 MIN: CPT | Mod: GC | Performed by: OPHTHALMOLOGY

## 2022-06-27 ASSESSMENT — REFRACTION_WEARINGRX
SPECS_TYPE: PAL
OD_ADD: +2.50
OS_CYLINDER: +2.50
OD_SPHERE: -1.00
OS_SPHERE: -2.50
OS_ADD: +2.50
OS_AXIS: 165
OD_CYLINDER: +3.50
OD_AXIS: 024

## 2022-06-27 ASSESSMENT — CONF VISUAL FIELD
OD_NORMAL: 1
OS_NORMAL: 1
METHOD: COUNTING FINGERS

## 2022-06-27 ASSESSMENT — TONOMETRY
IOP_METHOD: ICARE
OD_IOP_MMHG: 09
OS_IOP_MMHG: 08

## 2022-06-27 ASSESSMENT — VISUAL ACUITY
OS_PH_SC: 20/20
OS_SC: 20/40
OD_CC: 20/20
OD_PH_SC: 20/20
OS_PH_SC+: -1
OD_PH_SC+: -1
OS_CC: 20/20
OD_SC: 20/40
METHOD: SNELLEN - LINEAR

## 2022-06-27 NOTE — PROGRESS NOTES
Chief Complaints and History of Present Illnesses   Patient presents with     Blurred Vision Evaluation     Chief Complaint(s) and History of Present Illness(es)     Blurred Vision Evaluation     In both eyes.  Associated symptoms include dryness.  Negative for eye   pain, flashes and floaters.  Pain was noted as 0/10.              Comments     Patient present for blurring each eye. Patient did not bring glasses   with, Patient states dryness each eye. Patient states a need to use drops   all day.    CARLENE Sánchez June 27, 2022 2:00 PM    S/p bilateral direct brow lift 3/2/22  S/p Bilateral upper lids Javed's muscle conjunctival resection 4/21/21         Assessment & Plan     Arjun Amaya is a 62 year old male with the following diagnoses:   1. Postoperative eye state    2. Floppy eyelid syndrome of both eyes    3. Anterior basement membrane dystrophy of both eyes    4. Dry eye syndrome of both eyes    5. Ectropion due to laxity of eyelid, bilateral      - Pt's main complaints of cloudiness that improves with blinking, PFATs, raising eyelids seem to be related more to PCO and ABMD rather than floppy lid apposition, though he does have this component which is potentially causing evaporative loss  Plan  - Follow-up general clinic for Y[place] Cap  - Continue PFATs     Bilateral lateral tarsal strip           Attending Physician Attestation:  Complete documentation of historical and exam elements from today's encounter can be found in the full encounter summary report (not reduplicated in this progress note).  I personally obtained the chief complaint(s) and history of present illness.  I confirmed and edited as necessary the review of systems, past medical/surgical history, family history, social history, and examination findings as documented by others; and I examined the patient myself.  I personally reviewed the relevant tests, images, and reports as documented above.  I formulated and edited as necessary the  assessment and plan and discussed the findings and management plan with the patient and family. I personally reviewed the ophthalmic test(s) associated with this encounter, agree with the interpretation(s) as documented by the resident/fellow, and have edited the corresponding report(s) as necessary.   -Jayce Porter MD  2:12 PM 6/27/2022    Today with Arjun Amaya, I reviewed the indications, risks, benefits, and alternatives of the proposed surgical procedure including, but not limited to, failure obtain the desired result  and need for additional surgery, bleeding, infection, loss of vision, loss of the eye, and the remote possibility of permanent damage to any organ system or death with the use of anesthesia.  I provided multiple opportunities for the questions, answered all questions to the best of my ability, and confirmed that my answers and my discussion were understood.     - Jayce Porter MD 2:45 PM 6/27/2022

## 2022-06-27 NOTE — NURSING NOTE
Chief Complaints and History of Present Illnesses   Patient presents with     Blurred Vision Evaluation     Chief Complaint(s) and History of Present Illness(es)     Blurred Vision Evaluation     Laterality: both eyes    Associated symptoms: dryness.  Negative for eye pain, flashes and floaters    Pain scale: 0/10              Comments     Patient present for blurring each eye. Patient did not bring glasses with, Patient states dryness each eye. Patient states a need to use drops all day.    CARLENE Sánchez June 27, 2022 2:00 PM

## 2022-07-04 ENCOUNTER — TELEPHONE (OUTPATIENT)
Dept: OPHTHALMOLOGY | Facility: CLINIC | Age: 62
End: 2022-07-04

## 2022-07-04 NOTE — TELEPHONE ENCOUNTER
Met with patient to schedule surgery with Dr. Porter    Surgery was scheduled on 8/17 at [facility]  [facility]     Patient is aware a COVID-19 test is needed before their procedure. The test should be with-in 4 days of their procedure.   Test Details: Date 8/15  [facility]    Post-Op visit was scheduled on 8/29  Patient is aware a / is needed day of surgery.   Surgery packet was given 7/4, patient has my direct contact information for any further questions.

## 2022-08-04 ENCOUNTER — TELEPHONE (OUTPATIENT)
Dept: OPHTHALMOLOGY | Facility: CLINIC | Age: 62
End: 2022-08-04

## 2022-08-04 NOTE — TELEPHONE ENCOUNTER
I spoke with [facility] from the [facility], she was requesting information on patients upcoming surgery. I faxed over the surgery packet to [phone]. She also needs all prescriptions to be faxed to [facility] the fax number is [phone] the phone number is [phone]

## 2022-08-11 ENCOUNTER — TRANSFERRED RECORDS (OUTPATIENT)
Dept: HEALTH INFORMATION MANAGEMENT | Facility: CLINIC | Age: 62
End: 2022-08-11

## 2022-08-11 LAB
ALT SERPL-CCNC: 51 U/L
AST SERPL-CCNC: 39 U/L
CREATININE (EXTERNAL): >90 MG/DL
GFR ESTIMATED (EXTERNAL): NORMAL ML/MIN/1.73M2
GFR ESTIMATED (IF AFRICAN AMERICAN) (EXTERNAL): NORMAL ML/MIN/1.73M2
GLUCOSE (EXTERNAL): 122 MG/DL (ref 74–100)
POTASSIUM (EXTERNAL): 4.2 MMOL/L (ref 3.5–5.1)

## 2022-08-17 ENCOUNTER — ANESTHESIA (OUTPATIENT)
Dept: SURGERY | Facility: AMBULATORY SURGERY CENTER | Age: 62
End: 2022-08-17
Payer: COMMERCIAL

## 2022-08-17 ENCOUNTER — HOSPITAL ENCOUNTER (OUTPATIENT)
Facility: AMBULATORY SURGERY CENTER | Age: 62
Discharge: HOME | End: 2022-08-17
Attending: OPHTHALMOLOGY
Payer: COMMERCIAL

## 2022-08-17 ENCOUNTER — ANESTHESIA EVENT (OUTPATIENT)
Dept: SURGERY | Facility: AMBULATORY SURGERY CENTER | Age: 62
End: 2022-08-17
Payer: COMMERCIAL

## 2022-08-17 VITALS
RESPIRATION RATE: 14 BRPM | SYSTOLIC BLOOD PRESSURE: 129 MMHG | TEMPERATURE: 97 F | WEIGHT: 285 LBS | BODY MASS INDEX: 37.77 KG/M2 | HEART RATE: 83 BPM | DIASTOLIC BLOOD PRESSURE: 90 MMHG | HEIGHT: 73 IN | OXYGEN SATURATION: 95 %

## 2022-08-17 DIAGNOSIS — H02.109 ECTROPION DUE TO LAXITY OF EYELID, UNSPECIFIED LATERALITY: ICD-10-CM

## 2022-08-17 PROCEDURE — 67917 REPAIR EYELID DEFECT: CPT | Mod: E4

## 2022-08-17 PROCEDURE — 67917 REPAIR EYELID DEFECT: CPT | Mod: 59 | Performed by: OPHTHALMOLOGY

## 2022-08-17 RX ORDER — LIDOCAINE 40 MG/G
CREAM TOPICAL
Status: DISCONTINUED | OUTPATIENT
Start: 2022-08-17 | End: 2022-08-17 | Stop reason: HOSPADM

## 2022-08-17 RX ORDER — LIDOCAINE HYDROCHLORIDE AND EPINEPHRINE 10; 10 MG/ML; UG/ML
INJECTION, SOLUTION INFILTRATION; PERINEURAL PRN
Status: DISCONTINUED | OUTPATIENT
Start: 2022-08-17 | End: 2022-08-17 | Stop reason: HOSPADM

## 2022-08-17 RX ORDER — ERYTHROMYCIN 5 MG/G
OINTMENT OPHTHALMIC
Qty: 3.5 G | Refills: 0 | Status: SHIPPED | OUTPATIENT
Start: 2022-08-17

## 2022-08-17 RX ORDER — GLYCOPYRROLATE 0.2 MG/ML
INJECTION, SOLUTION INTRAMUSCULAR; INTRAVENOUS PRN
Status: DISCONTINUED | OUTPATIENT
Start: 2022-08-17 | End: 2022-08-17

## 2022-08-17 RX ORDER — LIDOCAINE HYDROCHLORIDE 20 MG/ML
INJECTION, SOLUTION INFILTRATION; PERINEURAL PRN
Status: DISCONTINUED | OUTPATIENT
Start: 2022-08-17 | End: 2022-08-17

## 2022-08-17 RX ORDER — PROPOFOL 10 MG/ML
INJECTION, EMULSION INTRAVENOUS CONTINUOUS PRN
Status: DISCONTINUED | OUTPATIENT
Start: 2022-08-17 | End: 2022-08-17

## 2022-08-17 RX ORDER — TETRACAINE HYDROCHLORIDE 5 MG/ML
SOLUTION OPHTHALMIC PRN
Status: DISCONTINUED | OUTPATIENT
Start: 2022-08-17 | End: 2022-08-17 | Stop reason: HOSPADM

## 2022-08-17 RX ORDER — SODIUM CHLORIDE, SODIUM LACTATE, POTASSIUM CHLORIDE, CALCIUM CHLORIDE 600; 310; 30; 20 MG/100ML; MG/100ML; MG/100ML; MG/100ML
500 INJECTION, SOLUTION INTRAVENOUS CONTINUOUS
Status: DISCONTINUED | OUTPATIENT
Start: 2022-08-17 | End: 2022-08-17 | Stop reason: HOSPADM

## 2022-08-17 RX ORDER — NEOMYCIN POLYMYXIN B SULFATES AND DEXAMETHASONE 3.5; 10000; 1 MG/ML; [USP'U]/ML; MG/ML
1 SUSPENSION/ DROPS OPHTHALMIC 4 TIMES DAILY
Qty: 3 ML | Refills: 0 | Status: SHIPPED | OUTPATIENT
Start: 2022-08-17

## 2022-08-17 RX ORDER — PROPOFOL 10 MG/ML
INJECTION, EMULSION INTRAVENOUS PRN
Status: DISCONTINUED | OUTPATIENT
Start: 2022-08-17 | End: 2022-08-17

## 2022-08-17 RX ORDER — ERYTHROMYCIN 5 MG/G
OINTMENT OPHTHALMIC PRN
Status: DISCONTINUED | OUTPATIENT
Start: 2022-08-17 | End: 2022-08-17 | Stop reason: HOSPADM

## 2022-08-17 RX ORDER — OXYCODONE HYDROCHLORIDE 5 MG/1
5 TABLET ORAL EVERY 6 HOURS PRN
Qty: 12 TABLET | Refills: 0 | Status: SHIPPED | OUTPATIENT
Start: 2022-08-17 | End: 2022-08-20

## 2022-08-17 RX ORDER — FENTANYL CITRATE 50 UG/ML
INJECTION, SOLUTION INTRAMUSCULAR; INTRAVENOUS PRN
Status: DISCONTINUED | OUTPATIENT
Start: 2022-08-17 | End: 2022-08-17

## 2022-08-17 RX ORDER — ONDANSETRON 2 MG/ML
INJECTION INTRAMUSCULAR; INTRAVENOUS PRN
Status: DISCONTINUED | OUTPATIENT
Start: 2022-08-17 | End: 2022-08-17

## 2022-08-17 RX ADMIN — PROPOFOL 10 MG: 10 INJECTION, EMULSION INTRAVENOUS at 11:06

## 2022-08-17 RX ADMIN — PROPOFOL 30 MCG/KG/MIN: 10 INJECTION, EMULSION INTRAVENOUS at 11:06

## 2022-08-17 RX ADMIN — PROPOFOL 20 MG: 10 INJECTION, EMULSION INTRAVENOUS at 10:57

## 2022-08-17 RX ADMIN — FENTANYL CITRATE 50 MCG: 50 INJECTION, SOLUTION INTRAMUSCULAR; INTRAVENOUS at 10:58

## 2022-08-17 RX ADMIN — SODIUM CHLORIDE, SODIUM LACTATE, POTASSIUM CHLORIDE, CALCIUM CHLORIDE 500 ML: 600; 310; 30; 20 INJECTION, SOLUTION INTRAVENOUS at 10:01

## 2022-08-17 RX ADMIN — ONDANSETRON 4 MG: 2 INJECTION INTRAMUSCULAR; INTRAVENOUS at 11:09

## 2022-08-17 RX ADMIN — GLYCOPYRROLATE 0.2 MG: 0.2 INJECTION, SOLUTION INTRAMUSCULAR; INTRAVENOUS at 10:39

## 2022-08-17 RX ADMIN — LIDOCAINE HYDROCHLORIDE 100 MG: 20 INJECTION, SOLUTION INFILTRATION; PERINEURAL at 10:45

## 2022-08-17 RX ADMIN — FENTANYL CITRATE 50 MCG: 50 INJECTION, SOLUTION INTRAMUSCULAR; INTRAVENOUS at 11:04

## 2022-08-17 RX ADMIN — PROPOFOL 80 MG: 10 INJECTION, EMULSION INTRAVENOUS at 10:45

## 2022-08-17 NOTE — DISCHARGE INSTRUCTIONS
Post-operative Instructions    Ophthalmic Plastic and Reconstructive Surgery  Jayce Porter M.D.  Pricila Kelley M.D.    All instructions apply to both operated EYES and EYELIDS    What to expect after surgery:  There will be some swelling, bruising, and likely a black eye (even into the lower eyelids and cheeks). Also expect crusting and discharge from the eye and/or incisions.   A small amount of surface bleeding is normal for the first 48 hours after surgery.  You may notice some bloody tears for the first few days after surgery. This is normal.  Your eye(s) and eyelid(s) may be painful and tender. This is normal after surgery. Use the pain medication as prescribed. If your pain does not improve despite the medication, contact the office.    Wound care and personal care:  If a patch or bandage has been placed, please leave this in place until seen in clinic. Prevent the bandage from getting wet.   Apply ice compresses 15 minutes on 15 minutes off while awake for the first 2 days after surgery, then switch to warm compresses 4 times a day until seen by your physician.   For ice compresses, place a cold towel on your eyelid(s), then place two frozen ice packs on top. You can consider frozen pea packs from any grocery store (many patients buy 4 packs). If the surgery center provided you a refillable ice pack, this works just as well.   For warm packs you can place a cup of dry uncooked rice in a clean cotton sock. Place sock in microwave 30 seconds to one minute. Next place the warm sock into a plastic bag and wrap the bag with clean warm wet washcloth and place over operated eye.    You may shower or wash your hair the day after surgery. Do not bathe or go swimming for 1 week to prevent contamination of your wounds.  Do not apply make-up to the eyes or eyelids for 2 weeks after surgery.    Activity restrictions and driving:  Avoid heavy lifting, bending, exercise or strenuous activity for 1 week after  surgery.  You may resume other activities and return to work as tolerated.  You may not resume driving until have you stopped using narcotic pain medications (such as oxycodone, Norco, Percocet, Tylenol #3).    Medications:  Restart all your regular home medications and eye drops today. If you take Plavix or Aspirin on a regular basis, wait for 3 days after your surgery before restarting these in order to decrease the risk of bleeding complications.  Avoid aspirin and aspirin-like medications (Motrin, Aleve, Ibuprofen, Juanita-Beatrice etc) for 5 days to reduce the risk of bleeding. You may take Tylenol (acetaminophen) for pain.  In addition to your home medications, take the following post-operative medications as prescribed by your physician:  Apply antibiotic ointment (erythromycin) to all sutures three times a day, and into the operated eye(s) at night.   Instill eye drops (Maxitrol) four times a day until the bottle is finished.   Take scheduled extra strength Tylenol (500mg) once every 6 hours for pain.  You may take 1 pain pill (oxycodone as prescribed) as needed for breakthrough pain up to every 6 hours.  The pain pills may make you drowsy. You must not drive a car, operate heavy machinery or drink alcohol while taking them.  The pain pills may cause constipation and nausea. Take them with some food to prevent a stomach upset. If you continue to experience nausea, call your physician.  WARNING: All the prescription pain medications listed above contain Tylenol (acetaminophen). You must not take more than 4,000 mg of acetaminophen per 24-hour period. This is equivalent to 6 tablets of Darvocet, 8 tablets of Vicodin, or 12 tablets of Norco, Percocet or Tylenol #3. If you take other over-the-counter medications containing acetaminophen, you must take the amount of acetaminophen into account and reduce the number of prescribed pain pills accordingly.    Contact information and follow-up:  Return to the Eye Clinic  for a follow-up appointment with your physician as scheduled. If no appointment has been scheduled, call [phone] for an appointment with Dr. Porter within 1 to 2 weeks from your date of surgery.  Please email a few photos of your eye(s) or other operative site(s) to [email or web address] prior to your follow up visit.    For severe pain, bleeding, or loss of vision, call the Eye Clinic at [phone].  After hours or on weekends and holidays, call [phone] and ask to speak with the ophthalmologist on call.      [facility]  Home Care Following Anesthesia  For 24 hours after surgery:  Get plenty of rest.  A responsible adult must stay with you for at least 24 hours after you leave the surgery center.  Do not drive or use heavy equipment.  If you have weakness or tingling, don't drive or use heavy equipment until this feeling goes away.   Do not drink alcohol.   Avoid strenuous or risky activities.  Ask for help when climbing stairs.  You may feel lightheaded.  IF so, sit for a few minutes before standing.  Have someone help you get up.   If you have nausea (feel sick to your stomach): Drink only clear liquids such as apple juice, ginger ale, broth or 7-Up.  Rest may also help.  Be sure to drink enough fluids.  Move to a regular diet as you feel able.   You may have a slight fever.  Call the doctor if your fever is over 100 F (37.7 C) (taken under the tongue) or lasts longer than 24 hours.  You may have a dry mouth, a sore throat, muscle aches or trouble sleeping. These should go away after 24 hours.  Do not make important or legal decisions.   It is recommended to avoid smoking.               Tips for taking pain medications  To get the best pain relief possible, remember these points:  Take pain medications as directed, before pain becomes severe.  Pain medication can upset your stomach: taking it with food may help.  Constipation is a common side effect of pain  medication. Drink plenty of  fluids.  Eat foods high in fiber. Take a stool softener if recommended by your doctor or pharmacist.  Do not drink alcohol, drive or operate machinery while taking pain medications.  Ask about other ways to control pain, such as with heat, ice or relaxation.    Tylenol/Acetaminophen Consumption  To help encourage the safe use of acetaminophen, the makers of TYLENOL  have lowered the maximum daily dose for single-ingredient Extra Strength TYLENOL  (acetaminophen) products sold in the [place] from 8 pills per day (4,000 mg) to 6 pills per day (3,000 mg). The dosing interval has also changed from 2 pills every 4-6 hours to 2 pills every 6 hours.  If you feel your pain relief is insufficient, you may take Tylenol/Acetaminophen in addition to your narcotic pain medication.   Be careful not to exceed 3,000 mg of Tylenol/Acetaminophen in a 24 hour period from all sources.  If you are taking extra strength Tylenol/acetaminophen (500 mg), the maximum dose is 6 tablets in 24 hours.  If you are taking regular strength acetaminophen (325 mg), the maximum dose is 9 tablets in 24 hours.    Call a doctor for any of the following:  Signs of infection (fever, growing tenderness at the surgery site, a large amount of drainage or bleeding, severe pain, foul-smelling drainage, redness, swelling).  It has been over 8 to 10 hours since surgery and you are still not able to urinate (pass water).  Headache for over 24 hours.  Numbness, tingling or weakness the day after surgery (if you had spinal anesthesia).  Signs of Covid-19 infection (temperature over 100 degrees, shortness of breath, cough, loss of taste/smell, generalized body aches, persistent headache, chills, sore throat, nausea/vomiting/diarrhea)  Your doctor is:  Dr. Jayce Porter, Ophthalmology: [phone]                    Or dial [phone] and ask for the resident on call for:  Ophthalmology  For emergency care, call the:  [facility]  Department:  [phone] (TTY for hearing impaired: [phone])

## 2022-08-17 NOTE — OP NOTE
PREOPERATIVE DIAGNOSIS: Bilateral lower eyelid ectropion  POSTOPERATIVE DIAGNOSIS: Bilateral lower eyelid ectropion  PROCEDURE:  Bilateral lower eyelid ectropion repair by tarsal strip procedure   ANESTHESIA: Monitored with local infiltration of 1% lidocaine with epinephrine.   SURGEON: Lenora Porter MD.  ASSISTANT: Pricila Kelley MD and  Radha Sequeira MD   COMPLICATIONS: None.   ESTIMATED BLOOD LOSS: Less than 5 mL.   HISTORY: Arjun Amaya  presented with tearing  due to lower lid ectropion. After the risks, benefits and alternatives to the proposed procedure were explained, informed consent was obtained.   DESCRIPTION OF PROCEDURE: Arjun Amaya was brought to the operating room and placed supine on the operating table. IV sedation was given. The lower lids and lateral canthal areas were infiltrated with local anesthetic. The area was prepped and draped in the typical fashion. Attention was directed to the right side. An 8 mm lateral canthal incision was made with a 15 blade and dissection carried down to the orbicularis with high temperature cautery. Lateral canthotomy and inferior cantholysis was performed with the cautery. A lateral tarsal strip was fashioned with the high temperature cautery and the sneha scissors. The tarsal strip was secured to the lateral orbital rim periosteum with a double-armed 5-0 PDS suture in a horizontal mattress fashion. Lateral canthal angle was closed with a gray line to gray line suture of 6-0 Vicryl tied internally. Skin was closed with interrupted 6-0 plain gut sutures.  The patient tolerated the procedure well. Attention was directed to the left side where the same procedure was performed. Antibiotic ointment was applied to the incisions. Arjun Amaya left the operating room in stable condition.     LENORA PORTER MD

## 2022-08-17 NOTE — ANESTHESIA CARE TRANSFER NOTE
Patient: Arjun Amaya    Procedure: Procedure(s):  Bilateral lower lid ectropion repair       Diagnosis: Ectropion due to laxity of eyelid, unspecified laterality [H02.109]  Diagnosis Additional Information: No value filed.    Anesthesia Type:   No value filed.     Note:    Oropharynx: oropharynx clear of all foreign objects and spontaneously breathing  Level of Consciousness: awake  Oxygen Supplementation: room air    Independent Airway: airway patency satisfactory and stable  Dentition: dentition unchanged  Vital Signs Stable: post-procedure vital signs reviewed and stable  Report to RN Given: handoff report given  Patient transferred to: Phase II    Handoff Report: Identifed the Patient, Identified the Reponsible Provider, Reviewed the pertinent medical history, Discussed the surgical course, Reviewed Intra-OP anesthesia mangement and issues during anesthesia, Set expectations for post-procedure period and Allowed opportunity for questions and acknowledgement of understanding      Vitals:  Vitals Value Taken Time   /87    Temp 97.0    Pulse 90    Resp 20    SpO2 90        Electronically Signed By: ALEJANDRO Villarreal CRNA  August 17, 2022  11:26 AM

## 2022-08-17 NOTE — BRIEF OP NOTE
[facility]    Brief Operative Note    Pre-operative diagnosis: Ectropion due to laxity of eyelid, unspecified laterality [H02.109]  Post-operative diagnosis Same as pre-operative diagnosis    Procedure: Procedure(s):  Bilateral lower lid ectropion repair  Surgeon: Surgeon(s) and Role:     * Jayce Porter MD - Primary     * Halle Sequeira MD - Resident - Assisting     * Pricila Kelley MD - Fellow - Assisting  Anesthesia: Monitor Anesthesia Care   Estimated Blood Loss: None    Drains: None  Specimens: * No specimens in log *  Findings:   None.  Complications: None.  Implants: * No implants in log *

## 2022-08-18 ENCOUNTER — TELEPHONE (OUTPATIENT)
Dept: OPHTHALMOLOGY | Facility: CLINIC | Age: 62
End: 2022-08-18

## 2022-08-18 NOTE — LETTER
August 18, 2022      Re: Arjun Amaya   1960    To Whom It May Concern:    This is to confirm that the above patient was seen on 8/17/2022 at [facility].    Thank you for your cooperation in this matter.  Please do not hesitate to contact me if you have any further questions.    Sincerely,        Jayce Porter MD

## 2022-08-18 NOTE — TELEPHONE ENCOUNTER
[facility] Call Center    Phone Message    May a detailed message be left on voicemail: yes     Reason for Call: Other: Requesting a note be faxed to [facility] to let them know that he showed up to his procedure on 8/17. Fax number [phone]     Action Taken: Message routed to:  [facility]    Travel Screening: Not Applicable

## 2022-08-21 NOTE — ANESTHESIA POSTPROCEDURE EVALUATION
Patient: Arjun Amaya    Procedure: Procedure(s):  Bilateral lower lid ectropion repair       Anesthesia Type:  MAC    Note:  Disposition: Outpatient   Postop Pain Control: Uneventful            Sign Out: Well controlled pain   PONV: No   Neuro/Psych: Uneventful            Sign Out: Acceptable/Baseline neuro status   Airway/Respiratory: Uneventful            Sign Out: Acceptable/Baseline resp. status   CV/Hemodynamics: Uneventful            Sign Out: Acceptable CV status; No obvious hypovolemia; No obvious fluid overload   Other NRE: NONE   DID A NON-ROUTINE EVENT OCCUR? No           Last vitals:  Vitals Value Taken Time   /90 08/17/22 1140   Temp 36.1  C (97  F) 08/17/22 1125   Pulse 83 08/17/22 1140   Resp 14 08/17/22 1140   SpO2 95 % 08/17/22 1140       Electronically Signed By: Philipp Middleton MD  August 21, 2022  2:55 PM

## 2022-08-21 NOTE — ANESTHESIA PREPROCEDURE EVALUATION
Anesthesia Pre-Procedure Evaluation    Patient: Arjun Amaya   MRN: [id number] : 1960        Procedure : Procedure(s):  Bilateral lower lid ectropion repair          Past Medical History:   Diagnosis Date     Hypertension       Past Surgical History:   Procedure Laterality Date     CATARACT IOL, RT/LT       PHACOEMULSIFICATION WITH STANDARD INTRAOCULAR LENS IMPLANT Right 10/21/2021    Procedure: RIGHT EYE PHACOEMULSIFICATION, CATARACT, WITH STANDARD INTRAOCULAR LENS IMPLANT INSERTION;  Surgeon: Thania Condon MD;  Location: [facility] OR     PHACOEMULSIFICATION WITH STANDARD INTRAOCULAR LENS IMPLANT Left 2021    Procedure: PHACOEMULSIFICATION, CATARACT, WITH STANDARD INTRAOCULAR LENS IMPLANT INSERTION;  Surgeon: Thania Condon MD;  [place] [facility] [place]     REPAIR ECTROPION Bilateral 2022    Procedure: Bilateral lower lid ectropion repair;  Surgeon: Jayce Porter MD;  [place] [facility] [place]     REPAIR PTOSIS BILATERAL Bilateral 2021    Procedure: Bilateral upper lid ptosis repair;  Surgeon: Jayce Porter MD;  [place] [facility] [place]     REPAIR PTOSIS BROW Bilateral 3/2/2022    Procedure: Bilateral direct brow lift;  Surgeon: Jayce Porter MD;  Location: [facility] OR      Allergies   Allergen Reactions     Ciprofloxacin Rash     Rash    Rash     Adderall      Other reaction(s): Psychotic disorder     Amphetamine-Dextroamphetamine Other (See Comments)     Other reaction(s): Psychotic disorder     Contrast Dye GI Disturbance     Other reaction(s): Unknown Reaction     Dextroamphetamine Other (See Comments)     Other reaction(s): Hallucination  Other reaction(s): Hallucinations  Other reaction(s): Hallucination       Valproic Acid Nausea and Vomiting      Social History     Tobacco Use     Smoking status: Never Smoker     Smokeless tobacco: Never Used   Substance Use Topics     Alcohol use: Never      Wt Readings from Last 1 Encounters:   22 129.3 kg (285 lb)        Anesthesia Evaluation   Pt  has had prior anesthetic.     No history of anesthetic complications       ROS/MED HX  ENT/Pulmonary:       Neurologic: Comment: TBI, seizures      Cardiovascular:     (+) hypertension-----    METS/Exercise Tolerance:     Hematologic:       Musculoskeletal:       GI/Hepatic:     (+) GERD,     Renal/Genitourinary:       Endo:     (+) thyroid problem, hypothyroidism, Obesity,     Psychiatric/Substance Use:       Infectious Disease:       Malignancy:       Other:            Physical Exam    Airway  airway exam normal           Respiratory Devices and Support         Dental  no notable dental history         Cardiovascular   cardiovascular exam normal          Pulmonary   pulmonary exam normal                OUTSIDE LABS:  CBC: No results found for: WBC, HGB, HCT, PLT  BMP: No results found for: NA, POTASSIUM, CHLORIDE, CO2, BUN, CR, GLC  COAGS: No results found for: PTT, INR, FIBR  POC: No results found for: BGM, HCG, HCGS  HEPATIC: No results found for: ALBUMIN, PROTTOTAL, ALT, AST, GGT, ALKPHOS, BILITOTAL, BILIDIRECT, CLEMENTINA  OTHER: No results found for: PH, LACT, A1C, RAMONA, PHOS, MAG, LIPASE, AMYLASE, TSH, T4, T3, CRP, SED    Anesthesia Plan    ASA Status:  2   NPO Status:  NPO Appropriate    Anesthesia Type: MAC.     - Reason for MAC: straight local not clinically adequate   Induction: Intravenous.   Maintenance: TIVA.        Consents    Anesthesia Plan(s) and associated risks, benefits, and realistic alternatives discussed. Questions answered and patient/representative(s) expressed understanding.    - Discussed:     - Discussed with:  Patient         Postoperative Care    Pain management: Multi-modal analgesia.   PONV prophylaxis: Ondansetron (or other 5HT-3)     Comments:                Philipp Middleton MD

## 2022-08-22 ENCOUNTER — TELEPHONE (OUTPATIENT)
Dept: OPHTHALMOLOGY | Facility: CLINIC | Age: 62
End: 2022-08-22

## 2022-08-22 NOTE — TELEPHONE ENCOUNTER
[facility] Call Center    Phone Message    May a detailed message be left on voicemail: yes     Reason for Call: Symptoms or Concerns     If patient has red-flag symptoms, warm transfer to triage line    Current symptom or concern: Left eye has been leaking all weekend post procedure Pt also experience some swelling in the area of procedure. Pt stated on Friday eye was leaking yellow fluid but now the fluid has turned into a clear fluid. Pt states left eye is drooping and can barely see out of area. believes stitching may have ripped out.    Symptoms have been present for:  3 day(s)    Has patient previously been seen for this? No    By : N/A    Date: N/A    Are there any new or worsening symptoms? No      Action Taken: Message routed to:  [facility]    Travel Screening: Not Applicable

## 2022-08-23 ENCOUNTER — TELEPHONE (OUTPATIENT)
Dept: OPHTHALMOLOGY | Facility: CLINIC | Age: 62
End: 2022-08-23

## 2022-08-23 NOTE — TELEPHONE ENCOUNTER
Eye note from 6- and op note from 8- faxed with last 4 provided    Adebayo Villalta RN 11:22 AM 08/23/22               [facility]    Phone Message    May a detailed message be left on voicemail: yes     Reason for Call: Form or Letter   Type or form/letter needing completion: [facility]  Pt called and needs proof that he was at the last eye appt and surgery, [facility], patient needs first name, last name and last four of ss# [id number] [facility]   Provider: Dr Porter  Date form needed: 8/23/2022  Once completed: Fax form to: [phone]      Action Taken: Message routed to:  [facility]    Travel Screening: Not Applicable

## 2022-08-29 ENCOUNTER — OFFICE VISIT (OUTPATIENT)
Dept: OPHTHALMOLOGY | Facility: CLINIC | Age: 62
End: 2022-08-29
Payer: COMMERCIAL

## 2022-08-29 DIAGNOSIS — H02.59 FLOPPY EYELID SYNDROME OF BOTH EYES: ICD-10-CM

## 2022-08-29 DIAGNOSIS — Z98.890 POSTOPERATIVE EYE STATE: Primary | ICD-10-CM

## 2022-08-29 PROCEDURE — 99024 POSTOP FOLLOW-UP VISIT: CPT | Performed by: OPHTHALMOLOGY

## 2022-08-29 RX ORDER — CEPHALEXIN 500 MG/1
500 CAPSULE ORAL 2 TIMES DAILY
Qty: 14 CAPSULE | Refills: 0 | Status: SHIPPED | OUTPATIENT
Start: 2022-08-29 | End: 2022-08-29

## 2022-08-29 RX ORDER — ERYTHROMYCIN 5 MG/G
0.5 OINTMENT OPHTHALMIC 3 TIMES DAILY
Qty: 7 G | Refills: 3 | Status: SHIPPED | OUTPATIENT
Start: 2022-08-29 | End: 2022-09-12

## 2022-08-29 RX ORDER — CEPHALEXIN 500 MG/1
500 CAPSULE ORAL 2 TIMES DAILY
Qty: 14 CAPSULE | Refills: 0 | Status: SHIPPED | OUTPATIENT
Start: 2022-08-29 | End: 2022-09-05

## 2022-08-29 RX ORDER — ERYTHROMYCIN 5 MG/G
0.5 OINTMENT OPHTHALMIC 3 TIMES DAILY
Qty: 3.5 G | Refills: 11 | Status: SHIPPED | OUTPATIENT
Start: 2022-08-29 | End: 2022-08-29

## 2022-08-29 ASSESSMENT — TONOMETRY
IOP_METHOD: ICARE
OS_IOP_MMHG: 11
OD_IOP_MMHG: 11

## 2022-08-29 ASSESSMENT — VISUAL ACUITY
OD_PH_SC: 20/20
OD_SC: 20/40
METHOD: SNELLEN - LINEAR
OS_SC: 20/30

## 2022-08-29 NOTE — NURSING NOTE
"Chief Complaints and History of Present Illnesses   Patient presents with     Follow Up For Surgery Of Eye     Chief Complaint(s) and History of Present Illness(es)     Follow Up For Surgery Of Eye     Laterality: left eye    Associated symptoms: Negative for dryness, eye pain, flashes and floaters              Comments     Patient states issues with left eye . \"It's leaking and hard to open\".  Patient states vision has improved. Patient denies pain ou.     CARLENE Sánchez August 29, 2022 8:59 AM               "

## 2022-08-29 NOTE — LETTER
August 29, 2022      Re: Arjun Amaya   1960    To Whom It May Concern:    This is to confirm that the above patient was seen on 8/29/2022.      Thank you for your cooperation in this matter.  Please do not hesitate to contact me if you have any further questions.    Sincerely,      Jayce Porter MD

## 2022-08-29 NOTE — PROGRESS NOTES
"Chief Complaints and History of Present Illnesses   Patient presents with     Follow Up For Surgery Of Eye     Chief Complaint(s) and History of Present Illness(es)     Follow Up For Surgery Of Eye     In left eye.  Associated symptoms include Negative for dryness, eye pain,   flashes and floaters.              Comments     Patient states issues with left eye . \"It's leaking and hard to open\".    Patient states vision has improved. Patient denies pain ou.     CARLENE Sánchez August 29, 2022 8:59 AM                    Assessment & Plan     Arjun Amaya is a 62 year old male with the following diagnoses:   1. Postoperative eye state    2. 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  I personally obtained the chief complaint(s) and history of present illness.  I confirmed and edited as necessary the review of systems, past medical/surgical history, family history, social history, and examination findings as documented by others; and I examined the patient myself.  I personally reviewed the relevant tests, images, and reports as documented above.  I formulated and edited as necessary the assessment and plan and discussed the findings and management plan with the patient and family. I personally reviewed the ophthalmic test(s) associated with this encounter, agree with the interpretation(s) as documented by the resident/fellow, and have edited the corresponding report(s) as necessary.   -Jayce Porter MD  9:08 AM 8/29/2022  "

## 2022-09-21 ENCOUNTER — TELEPHONE (OUTPATIENT)
Dept: OPHTHALMOLOGY | Facility: CLINIC | Age: 62
End: 2022-09-21

## 2022-09-21 NOTE — TELEPHONE ENCOUNTER
Unable to reach or LVM for patient regarding  rescheduling for missed POST-OP appointment due to [facility] was full.    Sent No show Letter to patient.

## 2022-11-21 ENCOUNTER — OFFICE VISIT (OUTPATIENT)
Dept: OPHTHALMOLOGY | Facility: CLINIC | Age: 62
End: 2022-11-21
Payer: COMMERCIAL

## 2022-11-21 DIAGNOSIS — H02.59 FLOPPY EYELID SYNDROME OF BOTH EYES: Primary | ICD-10-CM

## 2022-11-21 DIAGNOSIS — H26.493 PCO (POSTERIOR CAPSULE OPACIFICATION), BILATERAL: ICD-10-CM

## 2022-11-21 PROCEDURE — 99213 OFFICE O/P EST LOW 20 MIN: CPT | Mod: GC | Performed by: OPHTHALMOLOGY

## 2022-11-21 ASSESSMENT — CONF VISUAL FIELD
OS_INFERIOR_TEMPORAL_RESTRICTION: 0
OD_INFERIOR_TEMPORAL_RESTRICTION: 0
METHOD: COUNTING FINGERS
OD_SUPERIOR_NASAL_RESTRICTION: 0
OS_SUPERIOR_NASAL_RESTRICTION: 0
OS_NORMAL: 1
OS_INFERIOR_NASAL_RESTRICTION: 0
OD_NORMAL: 1
OS_SUPERIOR_TEMPORAL_RESTRICTION: 0
OD_INFERIOR_NASAL_RESTRICTION: 0
OD_SUPERIOR_TEMPORAL_RESTRICTION: 0

## 2022-11-21 ASSESSMENT — VISUAL ACUITY
OD_SC: 20/60
OS_SC: 20/20
OD_SC+: -2
OS_SC+: -2
OD_PH_SC: 20/20
METHOD: SNELLEN - LINEAR

## 2022-11-21 ASSESSMENT — TONOMETRY
OD_IOP_MMHG: 10
IOP_METHOD: ICARE
OS_IOP_MMHG: 10

## 2022-11-21 NOTE — PATIENT INSTRUCTIONS
- Switch to using celluvisc gel drops 4 times daily  - Start eyelid scrubs with either: nirali and nirali baby shampoo OR systane lid wipes OR ocusoft lid scrubs 2 times daily along eyelashes, then rinse off with water    [facility] you [facility] YAG capsulotomy

## 2022-11-21 NOTE — LETTER
" 2022         RE:  :  MRN: Arjun Amaya  1960  [id number]     Dear Doctor,    Your patient, Arjun Amaya, returned for oculoplastic follow up. My assessment and plan are below.  For further details, please see my attached clinic note.      patient feels the lids have healed well. But since the periorbital swelling has resolved he feelings he is looking \"through a telescope in the rain.\" he feels he constantly has to use ATs to clear the vision. He occasionally sees mucus discharge, but not significant amounts. He feels he is a driving hazard as he is unable to see at nighttime, and has difficulty transitioning from distance to near and vice versa.     Assessment & Plan     Arjun Amaya is a 62 year old male with the following diagnoses:   1. Floppy eyelid syndrome of both eyes    2. PCO (posterior capsule opacification), bilateral         POM3 s/p BLL ectropion repair by LTS  - lower lids are in great position  - no mucus discharge on exam today, but notably he does have enlarged superior fornix (left greater than right).  - patients biggest complaint is the decreased vision R>L \"looking through a telescope in the rain\" and a \"film coming across the eye that temporarily improves with artificial tears\". He has scattered PEE and a PCO R>L that may be contributing to his visual complaints  - switch to celluvisc QID  - start lid wipes BID  - follow up with comprehensive ophthalmology for YAG capsulotomy evaluation   - follow up 3-4 months           Again, thank you for allowing me to participate in the care of your patient.      Sincerely,    Jayce Porter MD  Department of Ophthalmology and Visual Neurosciences  [facility]      CC: [facility]  [address] [address]  Via Fax: [phone]    "

## 2022-11-21 NOTE — NURSING NOTE
"Chief Complaints and History of Present Illnesses   Patient presents with     Follow Up For Surgery Of Eye     Chief Complaint(s) and History of Present Illness(es)     Follow Up For Surgery Of Eye            Laterality: both eyes    Associated symptoms: Negative for eye pain, redness, flashes and floaters    Pain scale: 0/10          Comments    S/p bilateral lower ectropion repair by lateral tarsal strip. Patient states he cannot see, \"unless I manipulate my eyes and look just the right way\". Patient states no eye pain at this time. Patient not using erythromycin ointment or keflex. Patient using AT all day.     Jayna Cota, CARLENE November 21, 2022 8:16 AM                "

## 2022-11-21 NOTE — PROGRESS NOTES
"Chief Complaints and History of Present Illnesses   Patient presents with     Follow Up For Surgery Of Eye     Chief Complaint(s) and History of Present Illness(es)     Follow Up For Surgery Of Eye    In both eyes.  Associated symptoms include Negative for eye pain, redness,   flashes and floaters.  Pain was noted as 0/10.           Comments    S/p bilateral lower ectropion repair by lateral tarsal strip. Patient   states he cannot see, \"unless I manipulate my eyes and look just the right   way\". Patient states no eye pain at this time. Patient not using   erythromycin ointment or keflex. Patient using AT all day.     CARLENE Sánchez November 21, 2022 8:16 AM                 patient feels the lids have healed well. But since the periorbital swelling has resolved he feelings he is looking \"through a telescope in the rain.\" he feels he constantly has to use ATs to clear the vision. He occasionally sees mucus discharge, but not significant amounts. He feels he is a driving hazard as he is unable to see at nighttime, and has difficulty transitioning from distance to near and vice versa.     Assessment & Plan     Arjun Amaya is a 62 year old male with the following diagnoses:   1. Floppy eyelid syndrome of both eyes    2. PCO (posterior capsule opacification), bilateral         POM3 s/p BLL ectropion repair by LTS  - lower lids are in great position  - no mucus discharge on exam today, but notably he does have enlarged superior fornix (left greater than right).  - patients biggest complaint is the decreased vision R>L \"looking through a telescope in the rain\" and a \"film coming across the eye that temporarily improves with artificial tears\". He has scattered PEE and a PCO R>L that may be contributing to his visual complaints  - switch to celluvisc QID  - start lid wipes BID  - follow up with comprehensive ophthalmology for YAG capsulotomy evaluation   - follow up as needed              Pricila Kelley, " MD  Oculoplastics Fellow    Attending Physician Attestation:  I have seen and examined this patient with the fellow .  I have confirmed and edited as necessary the chief complaint(s), history of present illness, review of systems, relevant history, and examination findings as documented by others.  I have personally reviewed the relevant tests, images, and reports as documented above.  I have confirmed and edited as necessary the assessment and plan and agree with this note.    - Jayce Porter MD 9:06 AM 11/21/2022

## 2022-11-22 ENCOUNTER — TELEPHONE (OUTPATIENT)
Dept: OPHTHALMOLOGY | Facility: CLINIC | Age: 62
End: 2022-11-22

## 2022-11-22 NOTE — TELEPHONE ENCOUNTER
Spoke with patient regarding scheduling a Return in about 3 months (around 2/21/2023). Scheduled patient accordingly and sent AVS Printout to confirmed address.-Per Patient

## 2022-12-05 ENCOUNTER — TELEPHONE (OUTPATIENT)
Dept: OPHTHALMOLOGY | Facility: CLINIC | Age: 62
End: 2022-12-05

## 2022-12-05 ENCOUNTER — TELEPHONE (OUTPATIENT)
Dept: OTOLARYNGOLOGY | Facility: CLINIC | Age: 62
End: 2022-12-05

## 2022-12-05 NOTE — TELEPHONE ENCOUNTER
Spoke with patient and provided Medical Records Number of [id number] for patient. Patient seeking Authorization for Procedure.-Per Patient     Also sent Mukul PATEL message regarding [facility] for [facility]?

## 2023-01-17 ENCOUNTER — TELEPHONE (OUTPATIENT)
Dept: OPHTHALMOLOGY | Facility: CLINIC | Age: 63
End: 2023-01-17

## 2023-01-17 NOTE — TELEPHONE ENCOUNTER
VM is full     Shreyas can make an appointment with Dr. Fonseca on a Wed for a Tracey Munoz Communication Facilitator on 1/17/2023 at 10:20 AM

## 2023-01-18 ENCOUNTER — TELEPHONE (OUTPATIENT)
Dept: OPHTHALMOLOGY | Facility: CLINIC | Age: 63
End: 2023-01-18

## 2023-01-18 NOTE — TELEPHONE ENCOUNTER
Called Arjun many times and unable to leave a VM due to VM being full     Shreyas can make an appointment with Dr. Fonseca for a Tracey Munoz Communication Facilitator on 1/18/2023 at 8:57 AM

## 2023-01-23 NOTE — TELEPHONE ENCOUNTER
[facility] Call Center    Phone Message    May a detailed message be left on voicemail: yes     Reason for Call: Other: Patient calling back to schedule his procedure and states that his old phone is not working great so to call him on his new number at [phone]. Thank you.     Action Taken: Message routed to:  [facility]    Travel Screening: Not Applicable

## 2023-01-30 ENCOUNTER — HEALTH MAINTENANCE LETTER (OUTPATIENT)
Age: 63
End: 2023-01-30

## 2023-01-31 NOTE — TELEPHONE ENCOUNTER
Pt returned call to sched appt. Writer is unable to sched Yag procedure. Pt is very frustrated that this appt is not schedule yet and that nobody has gotten back to him about it. Please contact pt for scheduling options. Please contact pt at [phone]    Thank you

## 2023-02-17 DIAGNOSIS — H26.493 PCO (POSTERIOR CAPSULE OPACIFICATION), BILATERAL: Primary | ICD-10-CM

## 2023-02-21 ENCOUNTER — TELEPHONE (OUTPATIENT)
Dept: OPHTHALMOLOGY | Facility: CLINIC | Age: 63
End: 2023-02-21

## 2023-02-21 NOTE — TELEPHONE ENCOUNTER
Received VM from patient wanting to reschedule tomorrows in clinic procedure message sent to  and Ruddy Kaur to help with this.    Anna C. Schoenecker on 2/21/2023 at 3:35 PM

## 2023-03-27 ENCOUNTER — OFFICE VISIT (OUTPATIENT)
Dept: OPHTHALMOLOGY | Facility: CLINIC | Age: 63
End: 2023-03-27
Payer: COMMERCIAL

## 2023-03-27 ENCOUNTER — TELEPHONE (OUTPATIENT)
Dept: OPHTHALMOLOGY | Facility: CLINIC | Age: 63
End: 2023-03-27

## 2023-03-27 DIAGNOSIS — H04.123 DRY EYE SYNDROME OF BOTH EYES: Primary | ICD-10-CM

## 2023-03-27 DIAGNOSIS — H26.493 PCO (POSTERIOR CAPSULE OPACIFICATION), BILATERAL: ICD-10-CM

## 2023-03-27 DIAGNOSIS — H02.423 MYOGENIC PTOSIS OF EYELID OF BOTH EYES: ICD-10-CM

## 2023-03-27 PROCEDURE — 99213 OFFICE O/P EST LOW 20 MIN: CPT | Mod: GC | Performed by: OPHTHALMOLOGY

## 2023-03-27 RX ORDER — KETOCONAZOLE 20 MG/G
CREAM TOPICAL
COMMUNITY
Start: 2022-07-26 | End: 2023-07-27

## 2023-03-27 RX ORDER — SILDENAFIL 100 MG/1
50 TABLET, FILM COATED ORAL
COMMUNITY
Start: 2022-12-14

## 2023-03-27 RX ORDER — LISINOPRIL 40 MG/1
40 TABLET ORAL
COMMUNITY
Start: 2022-06-10

## 2023-03-27 RX ORDER — CLINDAMYCIN PHOSPHATE 10 UG/ML
LOTION TOPICAL
COMMUNITY
Start: 2022-05-24 | End: 2023-05-25

## 2023-03-27 RX ORDER — AMMONIUM LACTATE 12 G/100G
LOTION TOPICAL
COMMUNITY
Start: 2022-10-25

## 2023-03-27 RX ORDER — BENZOYL PEROXIDE 10 G/100G
SUSPENSION TOPICAL
COMMUNITY
Start: 2022-04-26 | End: 2023-04-27

## 2023-03-27 RX ORDER — OXYMETAZOLINE HYDROCHLORIDE OPHTHALMIC 1 MG/ML
1 SOLUTION/ DROPS OPHTHALMIC DAILY
Qty: 30 EACH | Refills: 3 | Status: SHIPPED | OUTPATIENT
Start: 2023-03-27

## 2023-03-27 ASSESSMENT — LEVATOR FUNCTION
OD_LEVATOR: 15
OS_LEVATOR: 15

## 2023-03-27 ASSESSMENT — TONOMETRY
IOP_METHOD: ICARE
OD_IOP_MMHG: 17
OS_IOP_MMHG: 16

## 2023-03-27 ASSESSMENT — VISUAL ACUITY
OD_SC: 20/100
OS_SC: 20/100
OS_SC+: +1
METHOD: SNELLEN - LINEAR

## 2023-03-27 ASSESSMENT — MARGIN REFLEX DISTANCE
OD_MRD2: 3
OS_MRD2: 3
OS_MRD1: 3
OD_MRD1: 4

## 2023-03-27 NOTE — PROGRESS NOTES
"Chief Complaints and History of Present Illnesses   Patient presents with     Follow Up      S/P Bilateral lower eyelid ectropion repair by tarsal strip procedure 8/17/22     Chief Complaint(s) and History of Present Illness(es)     Follow Up    In both eyes. Additional comments:  S/P Bilateral lower eyelid ectropion   repair by tarsal strip procedure 8/17/22           Comments    Pt states he is still unable to see. Pt says he is going to [facility]   to have his eyes fixed. Pt notes \" I am very angry, I could see fine   before the surgery.\"  Pt using hands to manipulate his eyes- Ats PRN, tape to hold brows, lid   wipes.   Marsha Livingston, COA on 3/27/2023 at 10:42 AM             Patient here for follow up - is upset with his vision and states he no longer sees well. He is extremely upset and feels like he is looking through water all the time. He is going to [place] to have his eyes corrected, but has not scheduled an appointment yet. [facility]. He does have an appt with Dr. Fonseca tomorrow in general clinic. He is here today because he notes that if he lifts his eyebrows up his vision seems to improve. He also notes that his vision is improved when his face is swollen so he has tried slapping himself repeatedly in the face to induce swelling to help with his vision. He is going through many bottles of ATs as well.         Assessment & Plan     Arjun Amaya is a 63 year old male with the following diagnoses:   1. Dry eye syndrome of both eyes    2. PCO (posterior capsule opacification), bilateral    3. Myogenic ptosis of eyelid of both eyes       S/p BLL ectropion repair by LTS in 08/2022  Lids in good position with mild temporal lash ptosis  Would not recommend additional eyelid surgery at this time  Encourage follow up in general clinic for posterior capsular opacity (PCO)    Ptosis/Lash ptosis Bilateral upper lids   Javier +ve  Try Upneeq - [facility]            Elke Osborn MD  Resident Physician, PGY-2  Department of Ophthalmology    Attending Physician Attestation:  I have seen and examined this patient with the resident .  I have confirmed and edited as necessary the chief complaint(s), history of present illness, review of systems, relevant history, and examination findings as documented by others.  I have personally reviewed the relevant tests, images, and reports as documented above.  I have confirmed and edited as necessary the assessment and plan and agree with this note.    - Jayce Porter MD 11:13 AM 3/27/2023

## 2023-03-27 NOTE — NURSING NOTE
"Chief Complaints and History of Present Illnesses   Patient presents with     Follow Up      S/P Bilateral lower eyelid ectropion repair by tarsal strip procedure 8/17/22     Chief Complaint(s) and History of Present Illness(es)     Follow Up            Laterality: both eyes    Comments:  S/P Bilateral lower eyelid ectropion repair by tarsal strip procedure 8/17/22          Comments    Pt states he is still unable to see. Pt says he is going to [facility] to have his eyes fixed. Pt notes \" I am very angry, I could see fine before the surgery.\"  Pt using hands to manipulate his eyes- Ats PRN, tape to hold brows, lid wipes.   Marsha Livingston, COA on 3/27/2023 at 10:42 AM                   "

## 2023-03-27 NOTE — PATIENT INSTRUCTIONS
Dry Eye    What is dry eye?  The eye depends on the flow of tears to provide constant moisture and lubrication to maintain vision and comfort. Tears are a combination of water, for moisture; oils, for lubrication; mucus, for even spreading; and antibodies and special proteins, for resistance to infection. These components are secreted by special glands located around the eye. When there is an imbalance in this tear system, a person may experience dry eye.  What are the symptoms?  When tears do not adequately lubricate the eye, a person may experience pain, a burning sensation, a gritty sensation, a feeling of a foreign body or sand in the eye, itching, redness, and blurring of vision. Sometimes, a person with dry eye will have excess tears running down the cheeks, which may seem confusing. This happens when the eye isn t getting enough lubrication. The eye sends a distress signal through the nervous system for more lubrication. In response, the eye is flooded with emergency tears. However, these tears are mostly water and do not have the lubricating qualities or the rich composition of normal tears. They will wash debris away, but they will not coat the eye surface properly.   What causes dry eye?  In addition to an imbalance in the tear-flow system of the eye, dry eye can be caused by the drying out of the tear film. This can be due to dry air created by air conditioning, heat, or other environmental conditions.  In addition, certain medications, illnesses and hormonal changes can cause dry eyes.    How is dry eye treated?  There are a number of steps that can be taken to treat dry eye. They include:  ? Artificial tear drops and ointments. The use of artificial tear drops is the primary treatment for dry eye. Artificial tear drops are available over the counter. No one drop works for everyone, so you might have to experiment to find the drop that works best for you. If you have chronic dry eye, it is important to  use the drops even when your eyes feel fine, to keep them lubricated. If your eyes dry out while you sleep, you can use a thicker lubricant, such as an ointment, at night.   ? Temporary punctal occlusion. Sometimes it is necessary to close the ducts that drain tears out of the eye. This is done via a painless procedure where a plug (which will dissolve within a few days) is inserted into the tear drain of the lower eyelid. This is a temporary procedure, done to determine whether permanent plugs can provide an adequate supply of tears.  ? Permanent punctal occlusion. If temporary plugging of the tear drains works well, then silicone plugs (punctal occlusion) may be used. The plugs will hold tears around the eyes as long as they are in place. They can be removed. Rarely, the plugs may come out spontaneously or migrate down the tear drain. Many patients find that the plugs improve comfort and reduce the need for artificial tears.  ? Medications.  Eye drops that decrease inflammation can sometimes be used to treat dry eye.  If prescribed, these drops need to be used under the careful supervision of your doctor.     ? Surgery. If needed, the ducts that drain tears into the nose can be permanently closed to allow more tears to remain around the eye. This is done with local anesthetic on an outpatient basis. There are no limitations in activity after having this surgery.  While dry eye cannot be cured, the symptoms can be greatly improved by these treatment options.  ________________________________________________    Specific Instructions for your case:    Preservative free artificial tears - these are available over the counter and should be used a minimum of 4 times a day and more frequently if needed    Warm compresses: perform 2 times daily, or as ordered by your doctor   1. Soak clean washcloth in hot water (not hot enough to case a burn)   2. Use a commercially available hot pack   3. Dry rice in a clean sock and  "microwave until hot       - Place warm compress on closed eyelids for about 5 minutes       - Gently massage eye lids for 30 seconds    Eyelid scrubs   1. Make solutions with a few drops of baby shampoo mixed with water OR   2. In shower: place baby shampoo on fingertips OR   3. Steri-Lid cleansing solution       - Use cleansing solution of choice on fingertips to \"brush\" your eyelids and lashes for  30  seconds       - rinse eyelids and lashes with clean water       - wipe dry with clean dry cloth (ashley cloth is best)    Blink exercises   - Squeeze your eyes tight 20 times in a row 3 times a day    Humidifier - use a humidifier at home, in your bedroom and, if possible, at work    Stay Hydrated - make sure you drink 4-6 8 oz glasses of water each day    "

## 2023-03-28 ENCOUNTER — OFFICE VISIT (OUTPATIENT)
Dept: OPHTHALMOLOGY | Facility: CLINIC | Age: 63
End: 2023-03-28
Attending: OPHTHALMOLOGY
Payer: COMMERCIAL

## 2023-03-28 DIAGNOSIS — H04.123 DRY EYE SYNDROME OF BOTH EYES: Primary | ICD-10-CM

## 2023-03-28 DIAGNOSIS — H18.523 ANTERIOR BASEMENT MEMBRANE DYSTROPHY OF BOTH EYES: ICD-10-CM

## 2023-03-28 DIAGNOSIS — H40.003 GLAUCOMA SUSPECT OF BOTH EYES: ICD-10-CM

## 2023-03-28 DIAGNOSIS — H02.59 FLOPPY EYELID SYNDROME OF BOTH EYES: ICD-10-CM

## 2023-03-28 DIAGNOSIS — Z96.1 PSEUDOPHAKIA OF BOTH EYES: ICD-10-CM

## 2023-03-28 DIAGNOSIS — H52.223 REGULAR ASTIGMATISM OF BOTH EYES: ICD-10-CM

## 2023-03-28 PROCEDURE — 99214 OFFICE O/P EST MOD 30 MIN: CPT | Performed by: OPHTHALMOLOGY

## 2023-03-28 PROCEDURE — G0463 HOSPITAL OUTPT CLINIC VISIT: HCPCS | Performed by: OPHTHALMOLOGY

## 2023-03-28 ASSESSMENT — TONOMETRY
OD_IOP_MMHG: 13
OS_IOP_MMHG: 15
IOP_METHOD: TONOPEN

## 2023-03-28 ASSESSMENT — CUP TO DISC RATIO
OD_RATIO: 0.55
OS_RATIO: 0.75

## 2023-03-28 ASSESSMENT — REFRACTION_MANIFEST
OS_ADD: +2.50
OD_AXIS: 025
OS_AXIS: 175
OS_SPHERE: -1.00
OD_CYLINDER: +3.75
OS_CYLINDER: +2.00
OD_ADD: +2.50
OD_SPHERE: -1.25

## 2023-03-28 ASSESSMENT — EXTERNAL EXAM - LEFT EYE: OS_EXAM: BROW IN GOOD POSITION

## 2023-03-28 ASSESSMENT — VISUAL ACUITY
OS_SC: 20/50
METHOD_MR: DIAGNOSTIC REFRACTION
OD_SC: 20/70
OD_PH_SC: 20/40
METHOD: SNELLEN - LINEAR
OS_PH_SC: 20/30

## 2023-03-28 ASSESSMENT — CONF VISUAL FIELD
OS_SUPERIOR_NASAL_RESTRICTION: 0
OS_INFERIOR_NASAL_RESTRICTION: 0
OD_SUPERIOR_NASAL_RESTRICTION: 0
OS_NORMAL: 1
METHOD: COUNTING FINGERS
OD_NORMAL: 1
OS_SUPERIOR_TEMPORAL_RESTRICTION: 0
OD_INFERIOR_TEMPORAL_RESTRICTION: 0
OD_SUPERIOR_TEMPORAL_RESTRICTION: 0
OS_INFERIOR_TEMPORAL_RESTRICTION: 0
OD_INFERIOR_NASAL_RESTRICTION: 0

## 2023-03-28 ASSESSMENT — EXTERNAL EXAM - RIGHT EYE: OD_EXAM: BROW IN GOOD POSITION

## 2023-03-28 NOTE — NURSING NOTE
Chief Complaints and History of Present Illnesses   Patient presents with     New Patient     Chief Complaint(s) and History of Present Illness(es)     New Patient            Laterality: both eyes    Quality: blurred vision    Associated symptoms: Negative for flashes and floaters    Treatments tried: artificial tears    Pain scale: 0/10          Comments    New patient.  The patient uses artificial tears often.   He states he has had blurred vision since cataract surgery two years ago.  Balbina Patten, COA, COA 2:09 PM 03/28/2023

## 2023-03-28 NOTE — PROGRESS NOTES
Chief Complaint(s) and History of Present Illness(es)     New Patient            Laterality: both eyes    Quality: blurred vision    Associated symptoms: double vision and photophobia.  Negative for flashes   and floaters    Treatments tried: artificial tears    Pain scale: 0/10          Comments    New patient.  The patient uses artificial tears often.   He states he has had blurred vision since cataract surgery two years ago.  The patient has intermittent double vision that he thinks has to do with   eye dominance and his amblyopia.  He also reports that he has had an optic nerve problem in the past.  Balbina Patten, COA, COA 2:09 PM 03/28/2023           Review of systems for the eyes was negative other than the pertinent positives/negatives listed in the HPI.      Assessment & Plan      Arjun Amaya is a 63 year old male with the following diagnoses:   1. Dry eye syndrome of both eyes    2. Anterior basement membrane dystrophy of both eyes    3. Floppy eyelid syndrome of both eyes    4. Regular astigmatism of both eyes    5. Pseudophakia of both eyes         Referral from Dr. Porter for blurry vision both eyes  Symptoms since cataract extraction   S/P multiple lid revisions and he feels this is contributing to his dry eye syndrome and blurred vision  Has not updated glasses for distance since surgery.  Symptoms improve with squinting and when eyelids are swollen  Using preserved tears (1 bottle per day)    Posterior capsular opacity (PCO) not visually significant   VISUALLY SIGNIFICANT dryness and astigmatism both eyes   Recommend stopping preserved drops  Start preservative free artificial tears every hour as needed   Offered referral for scleral contact lens eval  He is interested, [place]  Will seek care there  Refractive options reviewed  Refraction given for glasses      Patient disposition:   Return if symptoms worsen or fail to improve.           Attending  Physician Attestation:  Complete documentation of historical and exam elements from today's encounter can be found in the full encounter summary report (not reduplicated in this progress note).  I personally obtained the chief complaint(s) and history of present illness.  I confirmed and edited as necessary the review of systems, past medical/surgical history, family history, social history, and examination findings as documented by others; and I examined the patient myself.  I personally reviewed the relevant tests, images, and reports as documented above.  I formulated and edited as necessary the assessment and plan and discussed the findings and management plan with the patient and family. .I spent a total of 45 minutes reviewing chart, interpreting testing, documenting and face to face with the patient.  Over 50% of this time was spent counseling and coordinating care regarding their diagnosis and management.   - Terry Fonseca MD

## 2023-03-28 NOTE — LETTER
3/28/2023       RE: Arjun Amaya  [address]  [facility] [address]     Dear Colleague,    Thank you for referring your patient, Arjun Amaya, to the [facility] [place] at [facility]. Please see a copy of my visit note below.    Chief Complaint(s) and History of Present Illness(es)     New Patient            Laterality: both eyes    Quality: blurred vision    Associated symptoms: double vision and photophobia.  Negative for flashes   and floaters    Treatments tried: artificial tears    Pain scale: 0/10          Comments    New patient.  The patient uses artificial tears often.   He states he has had blurred vision since cataract surgery two years ago.  The patient has intermittent double vision that he thinks has to do with   eye dominance and his amblyopia.  He also reports that he has had an optic nerve problem in the past.  Balbina Patten, COA, COA 2:09 PM 03/28/2023           Review of systems for the eyes was negative other than the pertinent positives/negatives listed in the HPI.      Assessment & Plan     Arjun Amaya is a 63 year old male with the following diagnoses:   1. Dry eye syndrome of both eyes    2. Anterior basement membrane dystrophy of both eyes    3. Floppy eyelid syndrome of both eyes    4. Regular astigmatism of both eyes    5. Pseudophakia of both eyes         Referral from Dr. Porter for blurry vision both eyes  Symptoms since cataract extraction   S/P multiple lid revisions and he feels this is contributing to his dry eye syndrome and blurred vision  Has not updated glasses for distance since surgery.  Symptoms improve with squinting and when eyelids are swollen  Using preserved tears (1 bottle per day)    Posterior capsular opacity (PCO) not visually significant   VISUALLY SIGNIFICANT dryness and astigmatism both eyes   Recommend stopping preserved drops  Start preservative free artificial tears  every hour as needed   Offered referral for scleral contact lens eval  He is interested, but [place]  Will seek care there  Refractive options reviewed  Refraction given for glasses      Patient disposition:   Return if symptoms worsen or fail to improve.    Attending Physician Attestation:  Complete documentation of historical and exam elements from today's encounter can be found in the full encounter summary report (not reduplicated in this progress note).  I personally obtained the chief complaint(s) and history of present illness.  I confirmed and edited as necessary the review of systems, past medical/surgical history, family history, social history, and examination findings as documented by others; and I examined the patient myself.  I personally reviewed the relevant tests, images, and reports as documented above.  I formulated and edited as necessary the assessment and plan and discussed the findings and management plan with the patient and family. .I spent a total of 45 minutes reviewing chart, interpreting testing, documenting and face to face with the patient.  Over 50% of this time was spent counseling and coordinating care regarding their diagnosis and management.   - Terry Fonseca MD       Again, thank you for allowing me to participate in the care of your patient.      Sincerely,    Terry Fonseca MD

## 2023-03-28 NOTE — LETTER
March 28, 2023      Re: Arjun Amaya   1960    To Whom It May Concern:    This is to confirm that the above patient was seen on 3/28/2023.    Please do not hesitate to contact me if you have any further questions.    Sincerely,      Terry Fonseca MD  , Comprehensive Ophthalmology  Department of Ophthalmology and Visual Neurosciences  [facility]

## 2024-03-02 ENCOUNTER — HEALTH MAINTENANCE LETTER (OUTPATIENT)
Age: 64
End: 2024-03-02

## 2024-04-01 PROBLEM — H02.109 ECTROPION DUE TO LAXITY OF EYELID: Status: ACTIVE | Noted: 2022-06-27

## 2025-03-15 ENCOUNTER — HEALTH MAINTENANCE LETTER (OUTPATIENT)
Age: 65
End: 2025-03-15

## 2025-06-05 NOTE — PATIENT INSTRUCTIONS
Continue antibiotic ointment or bland lubricating ointment (eg vaseline or aquaphor) to the incision(s) two times a day.    Gently massage along the incision(s) two times a day.    Use warm soaks over the incision(s) four times a day until swelling and bruises resolve.       Home meds: atenolol-chlorthalidone 50-25, valsartan 320  Here: atenolol 50 mg, losartan 25 mg  - continue titration for normotension

## (undated) DEVICE — EYE CANN IRR 27GA ANTERIOR CHAMBER 581280

## (undated) DEVICE — PACK MINOR EYE CUSTOM ASC

## (undated) DEVICE — GLOVE PROTEXIS MICRO 7.5  2D73PM75

## (undated) DEVICE — ESU EYE HIGH TEMP 65410-183

## (undated) DEVICE — EYE KNIFE STILETTO VISITEC 1.1MM ANG 45DEG SIDEPORT 376620

## (undated) DEVICE — SOL WATER IRRIG 500ML BOTTLE 2F7113

## (undated) DEVICE — ESU GROUND PAD ADULT W/CORD E7507

## (undated) DEVICE — EYE KNIFE SLIT XSTAR VISITEC 2.6MM 45DEG 373726

## (undated) DEVICE — EYE PACK CUSTOM ANTERIOR 30DEG TIP CENTURION PPK6682-04

## (undated) DEVICE — EYE CANN IRR 25GA CYSTOTOME 581610

## (undated) DEVICE — GLOVE PROTEXIS MICRO 6.0  2D73PM60

## (undated) DEVICE — EYE SHIELD PLASTIC

## (undated) DEVICE — EYE PREP BETADINE 5% SOLUTION 30ML 0065-0411-30

## (undated) DEVICE — LINEN TOWEL PACK X5 5464

## (undated) DEVICE — ESU NDL COLORADO MICRO 3CM STR N103A

## (undated) DEVICE — SU PLAIN 6-0 G-1DA 18" 770G

## (undated) DEVICE — PACK CATARACT CUSTOM ASC SEY15CPUMC

## (undated) DEVICE — EYE TIP IRRIGATION & ASPIRATION POLYMER CVD 0.3MM 8065751512

## (undated) DEVICE — SU PDS II 5-0 RB-2DA 30" Z148H

## (undated) DEVICE — ESU PENCIL W/COATED BLADE E2450H

## (undated) DEVICE — SU VICRYL 5-0 P-3 18" UND J493G

## (undated) DEVICE — SU PROLENE 6-0 P-1 18" 8697G

## (undated) RX ORDER — FENTANYL CITRATE 50 UG/ML
INJECTION, SOLUTION INTRAMUSCULAR; INTRAVENOUS
Status: DISPENSED
Start: 2022-03-02

## (undated) RX ORDER — ACETAMINOPHEN 325 MG/1
TABLET ORAL
Status: DISPENSED
Start: 2021-04-21

## (undated) RX ORDER — PROPOFOL 10 MG/ML
INJECTION, EMULSION INTRAVENOUS
Status: DISPENSED
Start: 2022-03-02

## (undated) RX ORDER — TETRACAINE HYDROCHLORIDE 5 MG/ML
SOLUTION OPHTHALMIC
Status: DISPENSED
Start: 2022-03-02

## (undated) RX ORDER — PROPOFOL 10 MG/ML
INJECTION, EMULSION INTRAVENOUS
Status: DISPENSED
Start: 2022-08-17

## (undated) RX ORDER — FENTANYL CITRATE 50 UG/ML
INJECTION, SOLUTION INTRAMUSCULAR; INTRAVENOUS
Status: DISPENSED
Start: 2022-08-17

## (undated) RX ORDER — ONDANSETRON 2 MG/ML
INJECTION INTRAMUSCULAR; INTRAVENOUS
Status: DISPENSED
Start: 2021-11-04

## (undated) RX ORDER — ONDANSETRON 2 MG/ML
INJECTION INTRAMUSCULAR; INTRAVENOUS
Status: DISPENSED
Start: 2022-03-02

## (undated) RX ORDER — FENTANYL CITRATE 50 UG/ML
INJECTION, SOLUTION INTRAMUSCULAR; INTRAVENOUS
Status: DISPENSED
Start: 2021-11-04

## (undated) RX ORDER — ACETAMINOPHEN 325 MG/1
TABLET ORAL
Status: DISPENSED
Start: 2021-11-04

## (undated) RX ORDER — OXYMETAZOLINE HYDROCHLORIDE 0.05 G/100ML
SPRAY NASAL
Status: DISPENSED
Start: 2022-03-02

## (undated) RX ORDER — TOBRAMYCIN 3 MG/ML
SOLUTION/ DROPS OPHTHALMIC
Status: DISPENSED
Start: 2021-10-21

## (undated) RX ORDER — FENTANYL CITRATE 50 UG/ML
INJECTION, SOLUTION INTRAMUSCULAR; INTRAVENOUS
Status: DISPENSED
Start: 2021-04-21

## (undated) RX ORDER — ACETAMINOPHEN 325 MG/1
TABLET ORAL
Status: DISPENSED
Start: 2021-10-21

## (undated) RX ORDER — FENTANYL CITRATE 50 UG/ML
INJECTION, SOLUTION INTRAMUSCULAR; INTRAVENOUS
Status: DISPENSED
Start: 2021-10-21

## (undated) RX ORDER — GLYCOPYRROLATE 0.2 MG/ML
INJECTION, SOLUTION INTRAMUSCULAR; INTRAVENOUS
Status: DISPENSED
Start: 2022-08-17

## (undated) RX ORDER — GENTAMICIN 40 MG/ML
INJECTION, SOLUTION INTRAMUSCULAR; INTRAVENOUS
Status: DISPENSED
Start: 2022-03-02

## (undated) RX ORDER — LIDOCAINE HYDROCHLORIDE AND EPINEPHRINE 10; 10 MG/ML; UG/ML
INJECTION, SOLUTION INFILTRATION; PERINEURAL
Status: DISPENSED
Start: 2022-03-02

## (undated) RX ORDER — ERYTHROMYCIN 5 MG/G
OINTMENT OPHTHALMIC
Status: DISPENSED
Start: 2022-03-02

## (undated) RX ORDER — GABAPENTIN 300 MG/1
CAPSULE ORAL
Status: DISPENSED
Start: 2021-04-21

## (undated) RX ORDER — ACETAMINOPHEN 325 MG/1
TABLET ORAL
Status: DISPENSED
Start: 2022-03-02

## (undated) RX ORDER — LIDOCAINE HYDROCHLORIDE 20 MG/ML
INJECTION, SOLUTION EPIDURAL; INFILTRATION; INTRACAUDAL; PERINEURAL
Status: DISPENSED
Start: 2022-08-17